# Patient Record
Sex: FEMALE | Race: WHITE | NOT HISPANIC OR LATINO | Employment: FULL TIME | URBAN - METROPOLITAN AREA
[De-identification: names, ages, dates, MRNs, and addresses within clinical notes are randomized per-mention and may not be internally consistent; named-entity substitution may affect disease eponyms.]

---

## 2017-01-04 ENCOUNTER — RADIANT APPOINTMENT (OUTPATIENT)
Dept: MAMMOGRAPHY | Facility: CLINIC | Age: 42
End: 2017-01-04
Payer: COMMERCIAL

## 2017-01-04 DIAGNOSIS — Z00.00 ROUTINE GENERAL MEDICAL EXAMINATION AT A HEALTH CARE FACILITY: ICD-10-CM

## 2017-01-04 PROCEDURE — G0202 SCR MAMMO BI INCL CAD: HCPCS | Mod: TC

## 2017-01-13 ENCOUNTER — ALLIED HEALTH/NURSE VISIT (OUTPATIENT)
Dept: NURSING | Facility: CLINIC | Age: 42
End: 2017-01-13
Payer: COMMERCIAL

## 2017-01-13 VITALS — DIASTOLIC BLOOD PRESSURE: 78 MMHG | SYSTOLIC BLOOD PRESSURE: 114 MMHG

## 2017-01-13 DIAGNOSIS — Z01.30 BP CHECK: Primary | ICD-10-CM

## 2017-01-13 PROCEDURE — 99207 ZZC NO CHARGE NURSE ONLY: CPT

## 2017-01-13 NOTE — PROGRESS NOTES
Toshia Hebert is a 41 year old female who comes in today for a Blood Pressure check because of medication change.    *Document pulse and BP  *Use new set of vitals button for multiple readings.  *Use extended vitals for orthostatic    Vitals as recorded, a regular  cuff was used.    Patient is taking medication as prescribed  Patient is tolerating medications well.  Patient is monitoring Blood Pressure at home, but not lately.  Average readings if yes are high in the past    Current complaints: none    Disposition: results routed to MD/AP    ** question about the medication, is it a problem that she is in the sun a lot with this medication. **

## 2017-01-13 NOTE — Clinical Note
Patient has a question about her medication, heard that it is a issue with taking this medication and being in the sun a lot. Is this a concern?

## 2017-02-14 DIAGNOSIS — R51.9 NONINTRACTABLE HEADACHE, UNSPECIFIED CHRONICITY PATTERN, UNSPECIFIED HEADACHE TYPE: ICD-10-CM

## 2017-02-14 RX ORDER — BUTALBITAL, ACETAMINOPHEN AND CAFFEINE 50; 325; 40 MG/1; MG/1; MG/1
1 TABLET ORAL EVERY 4 HOURS PRN
Qty: 28 TABLET | Refills: 0 | Status: SHIPPED | OUTPATIENT
Start: 2017-02-14 | End: 2017-05-10

## 2017-02-14 NOTE — TELEPHONE ENCOUNTER
Rx was faxed to [facility], pharmacy will notify patient when ready to be picked up. Filled at TC desk.     Tayo Cano

## 2017-02-14 NOTE — TELEPHONE ENCOUNTER
Reason for call: Medication   If this is a refill request, has the caller requested the refill from the pharmacy already? N/A  Will the patient be using a [facility]? No  Name of the pharmacy and phone number for the current request: [facility] pharmacy in [place], [place]    Name of the medication requested: patients states she lost her paper prescription that she got for her migraines and wants to know if a prescription can be called into [place], [place]    Other request: none    Phone Number Pt can be reached at: Cell number on file:    Telephone Information:   Mobile [phone]     Best Time: anytime  Can we leave a detailed message on this number? YES

## 2017-03-16 ENCOUNTER — MYC MEDICAL ADVICE (OUTPATIENT)
Dept: FAMILY MEDICINE | Facility: CLINIC | Age: 42
End: 2017-03-16

## 2017-05-10 DIAGNOSIS — R51.9 NONINTRACTABLE HEADACHE, UNSPECIFIED CHRONICITY PATTERN, UNSPECIFIED HEADACHE TYPE: ICD-10-CM

## 2017-05-10 NOTE — TELEPHONE ENCOUNTER
Pending Prescriptions:                       Disp   Refills    butalbital-acetaminophen-caffeine (FIORIC*28 tab*0            Sig: Take 1 tablet by mouth every 4 hours as needed              Last Written Prescription Date: 2/14/2017  Last Fill Quantity: 28,  # refills: 0   Last Office Visit with [facility], [facility] or [facility] prescribing provider: 12/30/2016

## 2017-05-10 NOTE — TELEPHONE ENCOUNTER
Routing refill request to provider for review/approval because:  Drug not on the [facility] refill protocol.  Daniela Sanchez RN

## 2017-05-11 RX ORDER — BUTALBITAL, ACETAMINOPHEN AND CAFFEINE 50; 325; 40 MG/1; MG/1; MG/1
1 TABLET ORAL EVERY 4 HOURS PRN
Qty: 28 TABLET | Refills: 0 | Status: SHIPPED | OUTPATIENT
Start: 2017-05-11 | End: 2017-11-10

## 2017-05-11 NOTE — TELEPHONE ENCOUNTER
Rx was faxed to [facility], pharmacy will notify patient when ready to be picked up.   Filed at  desk.     Tayo Cano   05/11/17

## 2017-08-08 ENCOUNTER — TRANSFERRED RECORDS (OUTPATIENT)
Dept: HEALTH INFORMATION MANAGEMENT | Facility: CLINIC | Age: 42
End: 2017-08-08

## 2017-11-10 ENCOUNTER — OFFICE VISIT (OUTPATIENT)
Dept: FAMILY MEDICINE | Facility: CLINIC | Age: 42
End: 2017-11-10
Payer: COMMERCIAL

## 2017-11-10 VITALS
DIASTOLIC BLOOD PRESSURE: 80 MMHG | TEMPERATURE: 97.9 F | WEIGHT: 175 LBS | BODY MASS INDEX: 26.41 KG/M2 | SYSTOLIC BLOOD PRESSURE: 118 MMHG | RESPIRATION RATE: 14 BRPM | HEART RATE: 66 BPM

## 2017-11-10 DIAGNOSIS — I10 BENIGN ESSENTIAL HYPERTENSION: ICD-10-CM

## 2017-11-10 DIAGNOSIS — R51.9 NONINTRACTABLE HEADACHE, UNSPECIFIED CHRONICITY PATTERN, UNSPECIFIED HEADACHE TYPE: ICD-10-CM

## 2017-11-10 DIAGNOSIS — L98.9 SKIN LESION: Primary | ICD-10-CM

## 2017-11-10 DIAGNOSIS — J45.20 MILD INTERMITTENT ASTHMA WITHOUT COMPLICATION: ICD-10-CM

## 2017-11-10 PROCEDURE — 99214 OFFICE O/P EST MOD 30 MIN: CPT | Performed by: NURSE PRACTITIONER

## 2017-11-10 RX ORDER — LISINOPRIL 10 MG/1
10 TABLET ORAL DAILY
Qty: 90 TABLET | Refills: 1 | Status: SHIPPED | OUTPATIENT
Start: 2017-11-10 | End: 2018-08-07

## 2017-11-10 RX ORDER — BUTALBITAL, ACETAMINOPHEN AND CAFFEINE 50; 325; 40 MG/1; MG/1; MG/1
1 TABLET ORAL EVERY 4 HOURS PRN
Qty: 28 TABLET | Refills: 0 | Status: SHIPPED | OUTPATIENT
Start: 2017-11-10 | End: 2018-09-07

## 2017-11-10 NOTE — PROGRESS NOTES
"HPI    SUBJECTIVE:   Toshia Hebert is a 42 year old female who presents to clinic today for the following health issues:      Breast Problem      Duration: noticed this morning    Description (location/character/radiation): red lump on right breast.     Intensity:  mild    Accompanying signs and symptoms: none    History (similar episodes/previous evaluation): None    Precipitating or alleviating factors: None    Therapies tried and outcome: None   Has \"maybe\" has this bump before.  No itching and is non-tender.  In the past has \"maybe\" had a deep pain in the R breast, but attributed this to her underwire.     Hypertension Follow-up      Outpatient blood pressures are being checked at home.  Results are Systolic around 130 and diastolic in the 80s.  Stopped taking lisinopril with HCTZ because felt like BP was too low.  When she would take it would feel really dizzy.  Checked BP and \"top number would go way down\".  Systolic in the low 100s.      Low Salt Diet: no added salt  Had been on lisinopril for awhile and had HCTZ added about a year ago.        Problem list and histories reviewed & adjusted, as indicated.  Additional history: as documented    Current Outpatient Prescriptions   Medication Sig Dispense Refill     butalbital-acetaminophen-caffeine (FIORICET/ESGIC) -40 MG per tablet Take 1 tablet by mouth every 4 hours as needed 28 tablet 0     albuterol (PROAIR HFA, PROVENTIL HFA, VENTOLIN HFA) 108 (90 BASE) MCG/ACT inhaler Inhale 2 puffs into the lungs every 6 hours as needed for shortness of breath / dyspnea or wheezing 1 Inhaler 1     Magnesium Citrate POWD        UNKNOWN MED DOSAGE probiotic       lisinopril-hydrochlorothiazide (PRINZIDE/ZESTORETIC) 10-12.5 MG per tablet Take 1 tablet by mouth daily (Patient not taking: Reported on 11/10/2017) 90 tablet 1     Allergies   Allergen Reactions     Oxycodone Anxiety and Itching       Reviewed and updated as needed this visit by clinical staffTobacco  " Allergies  Problems  Med Hx  Soc Hx      Reviewed and updated as needed this visit by Provider         ROS:  Constitutional, HEENT, cardiovascular, pulmonary, gi and gu systems are negative, except as otherwise noted.      OBJECTIVE:   /80 (BP Location: Right arm, Cuff Size: Adult Regular)  Pulse 66  Temp 97.9  F (36.6  C) (Oral)  Resp 14  Wt 175 lb (79.4 kg)  LMP 01/21/2013  BMI 26.41 kg/m2  Body mass index is 26.41 kg/(m^2).  GENERAL: healthy, alert and no distress  EYES: Eyes grossly normal to inspection and conjunctivae and sclerae normal  NECK: no adenopathy, no asymmetry, masses, or scars and thyroid normal to palpation  RESP: lungs clear to auscultation - no rales, rhonchi or wheezes  CV: regular rate and rhythm, normal S1 S2, no S3 or S4, no murmur, click or rub, no peripheral edema and peripheral pulses strong  MS: no gross musculoskeletal defects noted, no edema  SKIN: ~3 mm slightly rasied papule, nontender, no swelling on the R breast about 2 cm above the aerola at the 8:00 position  NEURO: Normal strength and tone, mentation intact and speech normal  PSYCH: mentation appears normal, affect normal/bright    Diagnostic Test Results:  none     ASSESSMENT/PLAN:   1. Nonintractable headache, unspecified chronicity pattern, unspecified headache type  Using sparingly; refilled.   - butalbital-acetaminophen-caffeine (FIORICET/ESGIC) -40 MG per tablet; Take 1 tablet by mouth every 4 hours as needed  Dispense: 28 tablet; Refill: 0    2. Benign essential hypertension  Will stop HCTZ and have her continue with just lisinopril.  Check BP at home.  If >140/90 return for follow up otherwise see back in 6 mos.   - lisinopril (PRINIVIL/ZESTRIL) 10 MG tablet; Take 1 tablet (10 mg) by mouth daily  Dispense: 90 tablet; Refill: 1    3. Mild intermittent asthma without complication  ACT updated.  Controlled.    4. Skin lesion  Likely benign.  Monitor.  If any changing return for follow up.        Eloisa  NELSY Cohen [facility]      Physical Exam

## 2017-11-10 NOTE — PATIENT INSTRUCTIONS
If bump persists over the next 2 weeks return for follow up sooner if any worsening such as increasing in size, pain, tenderness or spreading to other areas.

## 2017-11-10 NOTE — MR AVS SNAPSHOT
After Visit Summary   11/10/2017    Toshia Hebert    MRN: [id number]           Patient Information     Date Of Birth          1975        Visit Information        Provider Department      11/10/2017 2:40 PM Eloisa Mchugh APRN CNP [facility]        Today's Diagnoses     Benign essential hypertension    -  1    Nonintractable headache, unspecified chronicity pattern, unspecified headache type        Mild intermittent asthma without complication          Care Instructions    If bump persists over the next 2 weeks return for follow up sooner if any worsening such as increasing in size, pain, tenderness or spreading to other areas.            Follow-ups after your visit        Follow-up notes from your care team     Return if symptoms worsen or fail to improve.      Who to contact     If you have questions or need follow up information about today's clinic visit or your schedule please contact [facility] directly at [phone].  Normal or non-critical lab and imaging results will be communicated to you by Emulishart, letter or phone within 4 business days after the clinic has received the results. If you do not hear from us within 7 days, please contact the clinic through Emulishart or phone. If you have a critical or abnormal lab result, we will notify you by phone as soon as possible.  Submit refill requests through Swarm or call your pharmacy and they will forward the refill request to us. Please allow 3 business days for your refill to be completed.          Additional Information About Your Visit        MyChart Information     Swarm gives you secure access to your electronic health record. If you see a primary care provider, you can also send messages to your care team and make appointments. If you have questions, please call your primary care clinic.  If you do not have a primary care provider, please call [phone] and they will assist you.         Care EveryWhere ID     This is your Care EveryWhere ID. This could be used by other organizations to access your [facility] medical records  [id number]        Your Vitals Were     Pulse Temperature Respirations Last Period BMI (Body Mass Index)       66 97.9  F (36.6  C) (Oral) 14 01/21/2013 26.41 kg/m2        Blood Pressure from Last 3 Encounters:   11/10/17 118/80   01/13/17 114/78   12/30/16 126/90    Weight from Last 3 Encounters:   11/10/17 175 lb (79.4 kg)   12/30/16 174 lb (78.9 kg)   03/21/16 187 lb 4.8 oz (85 kg)              Today, you had the following     No orders found for display         Today's Medication Changes          These changes are accurate as of: 11/10/17  3:28 PM.  If you have any questions, ask your nurse or doctor.               Start taking these medicines.        Dose/Directions    lisinopril 10 MG tablet   Commonly known as:  PRINIVIL/ZESTRIL   Used for:  Benign essential hypertension   Started by:  Eloisa Mchugh APRN CNP        Dose:  10 mg   Take 1 tablet (10 mg) by mouth daily   Quantity:  90 tablet   Refills:  1            Where to get your medicines      These medications were sent to [id number] - [place], [place] - [address]  [address] [address] [facility] [address]     Phone:  [phone]     lisinopril 10 MG tablet         Some of these will need a paper prescription and others can be bought over the counter.  Ask your nurse if you have questions.     Bring a paper prescription for each of these medications     butalbital-acetaminophen-caffeine -40 MG per tablet                Primary Care Provider Office Phone # Fax #    Dimple Reid [phone]   [facility] [address]        Equal Access to Services     [facility] ASHUTOSH : Lucy ballesteroso Sopauly, waaxda luqadaha, qaybta kaalmada adeegyada, bill chopra. So [facility] [phone].    ATENCIÓN: Si genet carrasco,  tiene a pereyra disposición servicios gratuitos de asistencia lingüística. Isac santos [phone].    We comply with applicable federal civil rights laws and [place] laws. We do not discriminate on the basis of race, color, national origin, age, disability, sex, sexual orientation, or gender identity.            Thank you!     Thank you for choosing [facility]  for your care. Our goal is always to provide you with excellent care. Hearing back from our patients is one way we can continue to improve our services. Please take a few minutes to complete the written survey that you may receive in the mail after your visit with us. Thank you!             Your Updated Medication List - Protect others around you: Learn how to safely use, store and throw away your medicines at www.disposemymeds.org.          This list is accurate as of: 11/10/17  3:28 PM.  Always use your most recent med list.                   Brand Name Dispense Instructions for use Diagnosis    albuterol 108 (90 BASE) MCG/ACT Inhaler    PROAIR HFA/PROVENTIL HFA/VENTOLIN HFA    1 Inhaler    Inhale 2 puffs into the lungs every 6 hours as needed for shortness of breath / dyspnea or wheezing    Mild intermittent asthma       butalbital-acetaminophen-caffeine -40 MG per tablet    FIORICET/ESGIC    28 tablet    Take 1 tablet by mouth every 4 hours as needed    Nonintractable headache, unspecified chronicity pattern, unspecified headache type       lisinopril 10 MG tablet    PRINIVIL/ZESTRIL    90 tablet    Take 1 tablet (10 mg) by mouth daily    Benign essential hypertension       lisinopril-hydrochlorothiazide 10-12.5 MG per tablet    PRINZIDE/ZESTORETIC    90 tablet    Take 1 tablet by mouth daily    Essential hypertension, benign       Magnesium Citrate Powd           UNKNOWN MED DOSAGE      probiotic

## 2017-11-11 ASSESSMENT — ASTHMA QUESTIONNAIRES: ACT_TOTALSCORE: 24

## 2018-01-19 ENCOUNTER — APPOINTMENT (OUTPATIENT)
Dept: GENERAL RADIOLOGY | Facility: CLINIC | Age: 43
End: 2018-01-19
Attending: EMERGENCY MEDICINE
Payer: COMMERCIAL

## 2018-01-19 ENCOUNTER — HOSPITAL ENCOUNTER (EMERGENCY)
Facility: CLINIC | Age: 43
Discharge: HOME | End: 2018-01-20
Attending: EMERGENCY MEDICINE | Admitting: EMERGENCY MEDICINE
Payer: COMMERCIAL

## 2018-01-19 ENCOUNTER — APPOINTMENT (OUTPATIENT)
Dept: CT IMAGING | Facility: CLINIC | Age: 43
End: 2018-01-19
Attending: EMERGENCY MEDICINE
Payer: COMMERCIAL

## 2018-01-19 VITALS
HEART RATE: 93 BPM | SYSTOLIC BLOOD PRESSURE: 154 MMHG | WEIGHT: 165 LBS | DIASTOLIC BLOOD PRESSURE: 96 MMHG | BODY MASS INDEX: 24.9 KG/M2 | TEMPERATURE: 98.5 F | OXYGEN SATURATION: 100 % | RESPIRATION RATE: 18 BRPM

## 2018-01-19 DIAGNOSIS — S09.90XA CLOSED HEAD INJURY, INITIAL ENCOUNTER: ICD-10-CM

## 2018-01-19 DIAGNOSIS — S50.02XA CONTUSION OF LEFT ELBOW, INITIAL ENCOUNTER: ICD-10-CM
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
## 2018-01-19 DIAGNOSIS — S06.0X1A CONCUSSION WITH LOSS OF CONSCIOUSNESS OF 30 MINUTES OR LESS, INITIAL ENCOUNTER: ICD-10-CM

## 2018-01-19 PROCEDURE — 99284 EMERGENCY DEPT VISIT MOD MDM: CPT | Mod: 25

## 2018-01-19 PROCEDURE — 73080 X-RAY EXAM OF ELBOW: CPT | Mod: LT

## 2018-01-19 PROCEDURE — 70450 CT HEAD/BRAIN W/O DYE: CPT

## 2018-01-19 NOTE — ED AVS SNAPSHOT
[facility] Emergency Department    [address]    [facility] [address]    Phone:  [phone]    Fax:  [phone]                                       Toshia Hebert   MRN: [id number]    Department:  [facility] Emergency Department   Date of Visit:  1/19/2018           After Visit Summary Signature Page     I have received my discharge instructions, and my questions have been answered. I have discussed any challenges I see with this plan with the nurse or doctor.    ..........................................................................................................................................  Patient/Patient Representative Signature      ..........................................................................................................................................  Patient Representative Print Name and Relationship to Patient    ..................................................               ................................................  Date                                            Time    ..........................................................................................................................................  Reviewed by Signature/Title    ...................................................              ..............................................  Date                                                            Time

## 2018-01-19 NOTE — ED AVS SNAPSHOT
[facility] Emergency Department    [address]    [facility] [address]    Phone:  [phone]    Fax:  [phone]                                       Toshia Hebert   MRN: [id number]    Department:  [facility] Emergency Department   Date of Visit:  1/19/2018           Patient Information     Date Of Birth          1975        Your diagnoses for this visit were:     Fall due to slipping on ice or snow, initial encounter     Closed head injury, initial encounter     Contusion of left elbow, initial encounter     Concussion with loss of consciousness of 30 minutes or less, initial encounter        You were seen by Shimon Vazquez MD.      Follow-up Information     Follow up with Dimple Reid MD.    Specialty:  Family Practice    Why:  As needed    Contact information:    [id number]   [facility] [address]  [phone]          Discharge Instructions       Discharge Instructions  Concussion    You were seen today for signs of a concussion.  The symptoms will vary, depending on the nature of your injury and your health. You may have: headache, confusion, nausea (feel sick to your stomach), vomiting (throwing up) and problems with memory, concentrating, or sleep. You may feel dizzy, irritable, and tired. Children and teens may need help from their parents, teachers, and coaches to watch for symptoms as they recover.    Generally, every Emergency Department visit should have a follow-up clinic visit with either a primary or a specialty clinic/provider. Please follow-up as instructed by your emergency provider today.     Return to the Emergency Department if:    Your headache gets worse or you start to have a really bad headache even with the recommended treatment plan.     You feel drowsier, have growing confusion, or slurred speech.     You keep repeating yourself.     You have strange behavior or are feeling more irritable.     You have a seizure.      You vomit (throw up) more than once.     You have trouble walking.     You have weakness or numbness.    Your neck pain gets worse.     You have a loss of consciousness.     You have blood for fluid coming from your ears or nose.     You have new symptoms or anything that worries you.     Home Care:    Get lots of rest and get enough sleep at night. Take daytime naps or rest if you feel tired.     Limit physical activity and  thinking  activities. These can make symptoms worse.   o Physical activities include gym, sports, weight training, running, exercise, and heavy lifting.   o Thinking activities include homework, class work, job-related work, and screen time (phone, computer, tablet, TV, and video games).     Stick to a healthy diet and drink lots of fluids. Avoid alcohol.    As symptoms improve, you may slowly return to your daily activities. If symptoms get worse or return, reduce your activity.     Know that it is normal to feel sad or frustrated when you do not feel right and are less active.     Going Back to Work:    Your care team will tell you when you are ready to return to work.      Limit the amount of work you do soon after your injury. This may speed healing. Take breaks if your symptoms get worse. You should also reduce your physical activity as well as activities that require a lot of thinking until you see your doctor. You may need shorter work days and a lighter workload.  Avoid heavy lifting, working with machinery, driving and working at heights until your symptoms are gone or you are cleared by a provider.    Going Back to School:    If you are still having symptoms, you may need extra help at school.    Tell your teachers and school nurse about your injury and symptoms. Ask them to watch for problems with learning, memory, and concentrating. Symptoms may get worse when you do schoolwork, and you may become more irritable. You may need shorter school days, a reduced workload, and to  postpone testing.  Do not drive or take gym class (physical activity) until cleared by a provider.    Returning to Sports:    Never return to play if you have any symptoms. A full recovery will reduce the chances of getting hurt again. Remember, it is better to miss one or two games than a whole season.    You should rest from all physical activity until you see your provider. Generally, if all symptoms have completely cleared, your provider can help guide you to slowly return to sports. If symptoms return or worsen, stop the activity and see your provider.    Important: If you are in an organized sport and under age 18, you will need written consent from a healthcare provider before you return to sports. Typically, this will be your primary care or sports medicine provider. Please make an appointment.    If you were given a prescription for medicine here today, be sure to read all of the information (including the package insert) that comes with your prescription.  This will include important information about the medicine, its side effects, and any warnings that you need to know about.  The pharmacist who fills the prescription can provide more information and answer questions you may have about the medicine.  If you have questions or concerns that the pharmacist cannot address, please call or return to the Emergency Department.     Remember that you can always come back to the Emergency Department if you are not able to see your regular provider in the amount of time listed above, if you get any new symptoms, or if there is anything that worries you.      Discharge References/Attachments     CONTUSION, ELBOW (ENGLISH)      24 Hour Appointment Hotline       To make an appointment at any [facility], call [phone]). If you don't have a family doctor or clinic, we will help you find one. [facility] clinics are conveniently located to serve the needs of you and your family.             Review  of your medicines      START taking        Dose / Directions Last dose taken    methocarbamol 500 MG tablet   Commonly known as:  ROBAXIN   Dose:  500 mg   Quantity:  9 tablet        Take 1 tablet (500 mg) by mouth 3 times daily as needed for muscle spasms   Refills:  0          Our records show that you are taking the medicines listed below. If these are incorrect, please call your family doctor or clinic.        Dose / Directions Last dose taken    albuterol 108 (90 BASE) MCG/ACT Inhaler   Commonly known as:  PROAIR HFA/PROVENTIL HFA/VENTOLIN HFA   Dose:  2 puff   Quantity:  1 Inhaler        Inhale 2 puffs into the lungs every 6 hours as needed for shortness of breath / dyspnea or wheezing   Refills:  1        butalbital-acetaminophen-caffeine -40 MG per tablet   Commonly known as:  FIORICET/ESGIC   Dose:  1 tablet   Quantity:  28 tablet        Take 1 tablet by mouth every 4 hours as needed   Refills:  0        lisinopril 10 MG tablet   Commonly known as:  PRINIVIL/ZESTRIL   Dose:  10 mg   Quantity:  90 tablet        Take 1 tablet (10 mg) by mouth daily   Refills:  1        Magnesium Citrate Powd        Refills:  0                Prescriptions were sent or printed at these locations (1 Prescription)                   Other Prescriptions                Printed at Department/Unit printer (1 of 1)         methocarbamol (ROBAXIN) 500 MG tablet                Procedures and tests performed during your visit     Elbow  XR, G/E 3 views, left    Head CT w/o contrast      Orders Needing Specimen Collection     None      Pending Results     No orders found for last 3 day(s).            Pending Culture Results     No orders found for last 3 day(s).            Pending Results Instructions     If you had any lab results that were not finalized at the time of your Discharge, you can call the ED Lab Result RN at [phone]. You will be contacted by this team for any positive Lab results or changes in treatment. The  nurses are available 7 days a week from 10A to 6:30P.  You can leave a message 24 hours per day and they will return your call.        Test Results From Your Hospital Stay        1/20/2018 12:02 AM      Narrative     CT SCAN OF THE HEAD WITHOUT CONTRAST   1/19/2018 11:56 PM     HISTORY: Fall.    TECHNIQUE:  Axial images of the head and coronal reformations without  IV contrast material. Radiation dose for this scan was reduced using  automated exposure control, adjustment of the mA and/or kV according  to patient size, or iterative reconstruction technique.    COMPARISON: 7/30/2014.    FINDINGS:  The ventricles are normal in size, shape and configuration.   The brain parenchyma and subarachnoid spaces are normal. There is no  evidence of intracranial hemorrhage, mass, acute infarct or anomaly.     The visualized portions of the sinuses and mastoids appear normal.  There is no evidence of trauma.        Impression     IMPRESSION: No acute abnormality.      NIC OCHOA MD         1/20/2018  1:19 AM      Narrative     XR ELBOW LT G/E 3 VW  1/20/2018 1:11 AM      HISTORY: Fall on ice.    COMPARISON: None.        Impression     IMPRESSION: No acute fracture or dislocation.    NIC OCHOA MD                Clinical Quality Measure: Blood Pressure Screening     Your blood pressure was checked while you were in the emergency department today. The last reading we obtained was  BP: (!) 154/96 . Please read the guidelines below about what these numbers mean and what you should do about them.  If your systolic blood pressure (the top number) is less than 120 and your diastolic blood pressure (the bottom number) is less than 80, then your blood pressure is normal. There is nothing more that you need to do about it.  If your systolic blood pressure (the top number) is 120-139 or your diastolic blood pressure (the bottom number) is 80-89, your blood pressure may be higher than it should be. You should have your blood  pressure rechecked within a year by a primary care provider.  If your systolic blood pressure (the top number) is 140 or greater or your diastolic blood pressure (the bottom number) is 90 or greater, you may have high blood pressure. High blood pressure is treatable, but if left untreated over time it can put you at risk for heart attack, stroke, or kidney failure. You should have your blood pressure rechecked by a primary care provider within the next 4 weeks.  If your provider in the emergency department today gave you specific instructions to follow-up with your doctor or provider even sooner than that, you should follow that instruction and not wait for up to 4 weeks for your follow-up visit.        Thank you for choosing [facility]       Thank you for choosing [facility] for your care. Our goal is always to provide you with excellent care. Hearing back from our patients is one way we can continue to improve our services. Please take a few minutes to complete the written survey that you may receive in the mail after you visit with us. Thank you!        [employer] Information     [facility] gives you secure access to your electronic health record. If you see a primary care provider, you can also send messages to your care team and make appointments. If you have questions, please call your primary care clinic.  If you do not have a primary care provider, please call [phone] and they will assist you.        Care EveryWhere ID     This is your Care EveryWhere ID. This could be used by other organizations to access your [facility] medical records  [id number]        Equal Access to Services     DIVYA BOYKIN : Hadii gonzalez Andrew, wafiona mast, qaybta booalbill gonzalez . So [facility] [phone].    ATENCIÓN: Si habla español, tiene a pereyra disposición servicios gratuitos de asistencia lingüística. Llame al [phone].    We comply with applicable federal civil rights laws  and [place] laws. We do not discriminate on the basis of race, color, national origin, age, disability, sex, sexual orientation, or gender identity.            After Visit Summary       This is your record. Keep this with you and show to your community pharmacist(s) and doctor(s) at your next visit.

## 2018-01-19 NOTE — LETTER
January 20, 2018      To Whom It May Concern:      Toshia Hebert was seen in our Emergency Department today, 01/20/18.  I expect her condition to improve over the next 2 days.  She may return to work/school when improved.    Sincerely,        Argenis Ferguson RN

## 2018-01-20 PROCEDURE — 25000132 ZZH RX MED GY IP 250 OP 250 PS 637: Performed by: EMERGENCY MEDICINE

## 2018-01-20 RX ORDER — METHOCARBAMOL 500 MG/1
500 TABLET, FILM COATED ORAL 3 TIMES DAILY PRN
Qty: 9 TABLET | Refills: 0 | Status: SHIPPED | OUTPATIENT
Start: 2018-01-20 | End: 2019-04-12

## 2018-01-20 RX ORDER — METHOCARBAMOL 500 MG/1
500 TABLET, FILM COATED ORAL ONCE
Status: COMPLETED | OUTPATIENT
Start: 2018-01-20 | End: 2018-01-20

## 2018-01-20 RX ADMIN — METHOCARBAMOL 500 MG: 500 TABLET ORAL at 01:38

## 2018-01-20 ASSESSMENT — ENCOUNTER SYMPTOMS
NECK PAIN: 1
HEADACHES: 1
NERVOUS/ANXIOUS: 1
NAUSEA: 1

## 2018-01-20 NOTE — ED PROVIDER NOTES
History     Chief Complaint:  Head Injury    HPI   Toshia Hebert is a 42 year old female who presents with a head injury.The patient states she was walking outside around 2100 today when she slipped on ice, causing her to hit her left elbow, fall back on her back first, and then hit her head on the ground hard. She also reports to have had a temporary loss of consciousness for an unspecified time with associated confusion. The patient reports her pain as 7/10 pain scale and localized bilaterally to the base of her neck with a diffuse headache. The patient reports to feel jittery and nauseous.     Allergies:  Oxycodone     Medications:    Fioricet/Esgic   Lisinopril  Albuterol     Past Medical History:    Dysmenorrhea  Incontinence of Urine  Asthma   Hypertension  Generalized Anxiety Disorder  Hypertension  TMJ disorder     Past Surgical History:    WESTON AMES, Cervical  Transpubic sling surgery  TMJ Surgery  Uterine Ablation    Family History:    Hypertension, father    Social History:  Marital Status:     Tobacco Status: Former Smoker, 2005 quit date  Alcohol use: Occasional     Review of Systems   Gastrointestinal: Positive for nausea.   Musculoskeletal: Positive for neck pain.   Neurological: Positive for syncope and headaches.   Psychiatric/Behavioral: The patient is nervous/anxious (feels jittery).    All other systems reviewed and are negative.      Physical Exam     Patient Vitals for the past 24 hrs:   BP Temp Temp src Pulse Resp SpO2 Weight   01/19/18 2227 (!) 154/96 98.5  F (36.9  C) Temporal 93 18 100 % 74.8 kg (165 lb)     Physical Exam  Nursing note and vitals reviewed.  Constitutional: Cooperative. Wearing sunglasses.   HENT:   Mouth/Throat: Mucous membranes are normal.   No palpable scalp hematoma.   Neck: No spinous process tenderness. Full range of motion of the neck  Cardiovascular: Normal rate, regular rhythm and normal heart sounds.  No murmur.  Pulmonary/Chest: Effort normal and  breath sounds normal. No respiratory distress. No wheezes. No rales.   Abdominal: Soft. Normal appearance and bowel sounds are normal. No distension. There is no tenderness. There is no rigidity and no guarding.   Musculoskeletal: Normal range of motion. Swelling and tenderness surrounding olecranon.  Neurological: Alert. Oriented x4.  GCS 15.  Strength and sensation in extremities normal.  CN 2-12 intact.   Skin: Skin is warm and dry.   Psychiatric: Normal mood and affect.       Emergency Department Course     Imaging:  Radiographic findings were communicated with the patient who voiced understanding of the findings.    X-ray Left Elbow, 3 views:  No acute fracture or dislocation  Result per radiology.     CT-scan Head w/o contrast:  No acute abnormality.  Result per radiology.     Interventions:  0138: Robaxin 500 mg PO    Emergency Department Course:  Past medical records, nursing notes, and vitals reviewed.  2323: I performed an exam of the patient and obtained history, as documented above.  The patient was sent for a X-ray and CT-scan while in the emergency department, findings above.   0125: I rechecked the patient. Findings and plan explained to the Patient. Patient discharged home with instructions regarding supportive care, medications, and reasons to return. The importance of close follow-up was reviewed.     Impression & Plan      Medical Decision Making:  Toshia Hebert is a 42 year old female who presents after slipping and falling on the ice. She sustained two injuries, one to the left elbow and the other after striking her head on the ice. She may have lost consciousness temporarily and describes headache, light sensitivity, and intermittent dizziness with movement consistent with concussion. Head scan is negative for fracture or intracranial hemorrhage. I have cleared her C-spine clinically. She does have significant spasm in her perispinous musculature, which I will try too treat with ibuprofen,  as well as a short course of muscle relaxants. Elbow X-ray is negative for fracture or any bony abnormaility. I did discuss with her that it is possible to occasionally miss a small fracture to the radial head or supracondylar region, and if she has persistent pain to get another X-ray in a week. She has been given a referral to a concussion clinic if her conditions do not improve, and she has been discharged home.     Diagnosis:    ICD-10-CM    1. Fall due to slipping on ice or snow, initial encounter W00.9XXA    2. Closed head injury, initial encounter S09.90XA    3. Contusion of left elbow, initial encounter S50.02XA    4. Concussion with loss of consciousness of 30 minutes or less, initial encounter S06.0X1A      Discharge Medications:   Details   methocarbamol (ROBAXIN) 500 MG tablet Take 1 tablet (500 mg) by mouth 3 times daily as needed for muscle spasms, Disp-9 tablet, R-0, Local Print     Yung Shepard  1/19/2018   [facility] EMERGENCY DEPARTMENT  I, Yung Shepard, am serving as a scribe at 11:23 PM on 1/19/2018 to document services personally performed by Shimon Vazquez MD based on my observations and the provider's statements to me.         Shimon Vazquez MD  01/20/18 1783

## 2018-01-20 NOTE — ED NOTES
Patient notes she is having some room spinning and also muscle spasms. Requesting something to help with symptoms. Dr. Vazquez updated. Patient notes she did take Ibuprofen prior to arrival and prefers not to take Zofran as it bothers her stomach. Dr. Vazquez updated.

## 2018-01-20 NOTE — ED NOTES
42-year-old female presents to the ER with complaints of slipping and falling on ice about 2050 tonight. Pt states her left arm is painful. States she hit her head and feels nauseated.

## 2018-08-07 DIAGNOSIS — I10 BENIGN ESSENTIAL HYPERTENSION: ICD-10-CM

## 2018-08-07 NOTE — TELEPHONE ENCOUNTER
"Requested Prescriptions   Pending Prescriptions Disp Refills     lisinopril (PRINIVIL/ZESTRIL) 10 MG tablet [Pharmacy Med Name: LISINOPRIL 10 MG TABLET] 90 tablet 1    Last Written Prescription Date:  11/10/17  Last Fill Quantity: 90,  # refills: 1   Last Office Visit: 11/10/2017   Future Office Visit:      Sig: TAKE 1 TABLET BY MOUTH ONCE DAILY    ACE Inhibitors (Including Combos) Protocol Failed    8/7/2018  1:53 AM       Failed - Blood pressure under 140/90 in past 12 months    BP Readings from Last 3 Encounters:   01/19/18 (!) 154/96   11/10/17 118/80   01/13/17 114/78                Failed - Normal serum creatinine on file in past 12 months    Recent Labs   Lab Test  12/30/16   1134   CR  0.68            Failed - Normal serum potassium on file in past 12 months    Recent Labs   Lab Test  12/30/16   1134   POTASSIUM  4.3            Passed - Recent (12 mo) or future (30 days) visit within the authorizing provider's specialty    Patient had office visit in the last 12 months or has a visit in the next 30 days with authorizing provider or within the authorizing provider's specialty.  See \"Patient Info\" tab in inbasket, or \"Choose Columns\" in Meds & Orders section of the refill encounter.           Passed - Patient is age 18 or older       Passed - No active pregnancy on record       Passed - No positive pregnancy test in past 12 months          "

## 2018-08-09 RX ORDER — LISINOPRIL 10 MG/1
TABLET ORAL
Qty: 90 TABLET | Refills: 0 | Status: SHIPPED | OUTPATIENT
Start: 2018-08-09 | End: 2018-09-07

## 2018-08-09 NOTE — TELEPHONE ENCOUNTER
Patient due for an appointment with blood work and blood pressure check.  Appointment scheduled for September 7th.  Will give refill to get patient through.  Daniela Sanchez RN

## 2018-09-07 ENCOUNTER — OFFICE VISIT (OUTPATIENT)
Dept: FAMILY MEDICINE | Facility: CLINIC | Age: 43
End: 2018-09-07
Payer: COMMERCIAL

## 2018-09-07 ENCOUNTER — RADIANT APPOINTMENT (OUTPATIENT)
Dept: GENERAL RADIOLOGY | Facility: CLINIC | Age: 43
End: 2018-09-07
Attending: FAMILY MEDICINE
Payer: COMMERCIAL

## 2018-09-07 VITALS
RESPIRATION RATE: 16 BRPM | BODY MASS INDEX: 25.73 KG/M2 | DIASTOLIC BLOOD PRESSURE: 92 MMHG | HEART RATE: 60 BPM | SYSTOLIC BLOOD PRESSURE: 126 MMHG | TEMPERATURE: 98.1 F | HEIGHT: 68 IN | WEIGHT: 169.8 LBS

## 2018-09-07 DIAGNOSIS — D16.22 BENIGN NEOPLASM OF LONG BONE OF LEFT LOWER EXTREMITY: ICD-10-CM

## 2018-09-07 DIAGNOSIS — I10 HYPERTENSION, GOAL BELOW 140/90: Primary | ICD-10-CM

## 2018-09-07 DIAGNOSIS — R51.9 NONINTRACTABLE HEADACHE, UNSPECIFIED CHRONICITY PATTERN, UNSPECIFIED HEADACHE TYPE: ICD-10-CM

## 2018-09-07 DIAGNOSIS — J45.990 EXERCISE-INDUCED ASTHMA: ICD-10-CM

## 2018-09-07 PROCEDURE — 99214 OFFICE O/P EST MOD 30 MIN: CPT | Performed by: FAMILY MEDICINE

## 2018-09-07 PROCEDURE — 73552 X-RAY EXAM OF FEMUR 2/>: CPT | Mod: LT

## 2018-09-07 RX ORDER — BUTALBITAL, ACETAMINOPHEN AND CAFFEINE 50; 325; 40 MG/1; MG/1; MG/1
1 TABLET ORAL EVERY 4 HOURS PRN
Qty: 30 TABLET | Refills: 0 | Status: SHIPPED | OUTPATIENT
Start: 2018-09-07 | End: 2020-03-17

## 2018-09-07 RX ORDER — LISINOPRIL 10 MG/1
10 TABLET ORAL DAILY
Qty: 90 TABLET | Refills: 0 | Status: SHIPPED | OUTPATIENT
Start: 2018-09-07 | End: 2018-12-29

## 2018-09-07 RX ORDER — ALBUTEROL SULFATE 90 UG/1
2 AEROSOL, METERED RESPIRATORY (INHALATION) EVERY 6 HOURS PRN
Qty: 1 INHALER | Refills: 1 | Status: SHIPPED | OUTPATIENT
Start: 2018-09-07 | End: 2018-09-07

## 2018-09-07 RX ORDER — ALBUTEROL SULFATE 90 UG/1
2 AEROSOL, METERED RESPIRATORY (INHALATION) EVERY 6 HOURS PRN
Qty: 1 INHALER | Refills: 1 | Status: SHIPPED | OUTPATIENT
Start: 2018-09-07

## 2018-09-07 ASSESSMENT — PAIN SCALES - GENERAL: PAINLEVEL: NO PAIN (0)

## 2018-09-07 NOTE — MR AVS SNAPSHOT
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      Future tests that were ordered for you today     Open Future Orders        Priority Expected Expires Ordered    XR Femur Left 2 Views Routine 9/7/2018 9/7/2019 9/7/2018            Who to contact     If you have questions or need follow up information about today's clinic visit or your schedule please contact [facility] directly at [phone].  Normal or non-critical lab and imaging results will be communicated to you by [employer], letter or phone within 4 business days after the clinic has received the results. If you do not hear from us within 7 days, please contact the clinic through [employer] or phone. If you have a critical or abnormal lab result, we will notify you by phone as soon as possible.  Submit refill requests through [employer] or call your pharmacy and they will forward the refill request to us. Please allow 3 business days for your refill to be completed.          Additional Information About Your Visit        MyChart Information     [employer] gives you secure access to your electronic health record. If you see a primary care provider, you can also send messages to your care team and make appointments. If you have questions, please call your primary care clinic.  If you do not have a primary care provider, please call  "[phone] and they will assist you.        Care EveryWhere ID     This is your Care EveryWhere ID. This could be used by other organizations to access your [facility] medical records  [id number]        Your Vitals Were     Pulse Temperature Respirations Height Last Period BMI (Body Mass Index)    60 98.1  F (36.7  C) (Oral) 16 5' 8\" (1.727 m) 01/25/2013 25.82 kg/m2       Blood Pressure from Last 3 Encounters:   09/07/18 (!) 130/96   01/19/18 (!) 154/96   11/10/17 118/80    Weight from Last 3 Encounters:   09/07/18 169 lb 12.8 oz (77 kg)   01/19/18 165 lb (74.8 kg)   11/10/17 175 lb (79.4 kg)                 Today's Medication Changes          These changes are accurate as of 9/7/18 11:59 AM.  If you have any questions, ask your nurse or doctor.               These medicines have changed or have updated prescriptions.        Dose/Directions    * albuterol 108 (90 Base) MCG/ACT inhaler   Commonly known as:  PROAIR HFA/PROVENTIL HFA/VENTOLIN HFA   This may have changed:  Another medication with the same name was added. Make sure you understand how and when to take each.   Used for:  Mild intermittent asthma   Changed by:  Dimple Reid MD        Dose:  2 puff   Inhale 2 puffs into the lungs every 6 hours as needed for shortness of breath / dyspnea or wheezing   Quantity:  1 Inhaler   Refills:  1       * albuterol 108 (90 Base) MCG/ACT inhaler   Commonly known as:  PROAIR HFA   This may have changed:  You were already taking a medication with the same name, and this prescription was added. Make sure you understand how and when to take each.   Used for:  Exercise-induced asthma   Changed by:  Dimple Reid MD        Dose:  2 puff   Inhale 2 puffs into the lungs every 6 hours as needed for shortness of breath / dyspnea   Quantity:  1 Inhaler   Refills:  1       lisinopril 10 MG tablet   Commonly known as:  PRINIVIL/ZESTRIL   This may have changed:  See the new instructions.   Used for:  " Hypertension, goal below 140/90   Changed by:  Dimple Reid MD        Dose:  10 mg   Take 1 tablet (10 mg) by mouth daily   Quantity:  90 tablet   Refills:  0       * Notice:  This list has 2 medication(s) that are the same as other medications prescribed for you. Read the directions carefully, and ask your doctor or other care provider to review them with you.         Where to get your medicines      These medications were sent to [facility]/pharmacy #[id number] - [place], [place] - [address].  [address] [facility] [address]     Phone:  [phone]     lisinopril 10 MG tablet         Some of these will need a paper prescription and others can be bought over the counter.  Ask your nurse if you have questions.     Bring a paper prescription for each of these medications     albuterol 108 (90 Base) MCG/ACT inhaler    butalbital-acetaminophen-caffeine -40 MG per tablet                Primary Care Provider Office Phone # Fax #    Dimple Reid [phone]       [id number]  [address]   [facility] [address]        Equal Access to Services     [facility] AH: Hadii aad ku hadasho Soomaali, waaxda luqadaha, qaybta kaalmada adeashuyavilma, bill harmon . So [facility] [phone].    ATENCIÓN: Si habla español, tiene a pereyra disposición servicios gratUNM [facility] de asistencia lingüística. Isac al [phone].    We comply with applicable federal civil rights laws and [place] laws. We do not discriminate on the basis of race, color, national origin, age, disability, sex, sexual orientation, or gender identity.            Thank you!     Thank you for choosing [facility]  for your care. Our goal is always to provide you with excellent care. Hearing back from our patients is one way we can continue to improve our services. Please take a few minutes to complete the written survey that you may receive in the mail after your visit with us. Thank you!              Your Updated Medication List - Protect others around you: Learn how to safely use, store and throw away your medicines at www.disposemymeds.org.          This list is accurate as of 9/7/18 11:59 AM.  Always use your most recent med list.                   Brand Name Dispense Instructions for use Diagnosis    * albuterol 108 (90 Base) MCG/ACT inhaler    PROAIR HFA/PROVENTIL HFA/VENTOLIN HFA    1 Inhaler    Inhale 2 puffs into the lungs every 6 hours as needed for shortness of breath / dyspnea or wheezing    Mild intermittent asthma       * albuterol 108 (90 Base) MCG/ACT inhaler    PROAIR HFA    1 Inhaler    Inhale 2 puffs into the lungs every 6 hours as needed for shortness of breath / dyspnea    Exercise-induced asthma       butalbital-acetaminophen-caffeine -40 MG per tablet    FIORICET/ESGIC    30 tablet    Take 1 tablet by mouth every 4 hours as needed    Nonintractable headache, unspecified chronicity pattern, unspecified headache type       lisinopril 10 MG tablet    PRINIVIL/ZESTRIL    90 tablet    Take 1 tablet (10 mg) by mouth daily    Hypertension, goal below 140/90       Magnesium Citrate Powd           methocarbamol 500 MG tablet    ROBAXIN    9 tablet    Take 1 tablet (500 mg) by mouth 3 times daily as needed for muscle spasms        * Notice:  This list has 2 medication(s) that are the same as other medications prescribed for you. Read the directions carefully, and ask your doctor or other care provider to review them with you.

## 2018-09-07 NOTE — PROGRESS NOTES
SUBJECTIVE:   Toshia Hebert is a 43 year old female who presents to clinic today for the following health issues:    Hypertension Follow-up      Outpatient blood pressures are not being checked.    Low Salt Diet: no added salt      Amount of exercise or physical activity: None, does a little bit of biking     Problems taking medications regularly: No    Medication side effects: none    Diet: regular (no restrictions)    Taking lisinopril, when she took the water pill to low, so wants to just take the lisinopril. It is working well, no side effects. If misses a dose will get a headache, but rarely misses      Left leg pain 1 month ago, lasted 1 week, deep in the upper thigh area. Hx of benign mass in the bone, seen by [place], they felt it was benign, but keep eye on it, with x-rays every 6 months. She is due for this today.      Declined a flu shot    Migraines are well controlled, takes 2 pills to get rid of the headache, comes at most 1 per month, might be related to hormones. Needing refill on fioricet, rarely takes it, last rx lasted nearly a yr        PROBLEMS TO ADD ON...    Problem list and histories reviewed & adjusted, as indicated.  Additional history: as documented    BP Readings from Last 3 Encounters:   09/07/18 (!) 126/92   01/19/18 (!) 154/96   11/10/17 118/80    Wt Readings from Last 3 Encounters:   09/07/18 169 lb 12.8 oz (77 kg)   01/19/18 165 lb (74.8 kg)   11/10/17 175 lb (79.4 kg)                  Labs reviewed in EPIC    Reviewed and updated as needed this visit by clinical staff  Tobacco  Allergies  Meds  Problems  Med Hx  Surg Hx  Fam Hx  Soc Hx        Reviewed and updated as needed this visit by Provider         ROS:  Constitutional, HEENT, cardiovascular, pulmonary, gi and gu systems are negative, except as otherwise noted.    OBJECTIVE:     BP (!) 126/92 (BP Location: Right arm, Patient Position: Chair, Cuff Size: Adult Regular)  Pulse 60  Temp 98.1  F (36.7  C) (Oral)  Resp 16  " Ht 5' 8\" (1.727 m)  Wt 169 lb 12.8 oz (77 kg)  LMP 01/25/2013  BMI 25.82 kg/m2  Body mass index is 25.82 kg/(m^2).  GENERAL: healthy, alert and no distress  EYES: Eyes grossly normal to inspection,  and conjunctivae and sclerae normal  HENT: ear canals and TM's normal, nose and mouth without ulcers or lesions  NECK: no adenopathy, no asymmetry, masses, or scars and thyroid normal to palpation  RESP: lungs clear to auscultation - no rales, rhonchi or wheezes  CV: regular rate and rhythm, normal S1 S2, no S3 or S4, no murmur, click or rub, no peripheral edema and peripheral pulses strong  MS: no gross musculoskeletal defects noted, no edema    Diagnostic Test Results:  none     ASSESSMENT/PLAN:             1. Hypertension, goal below 140/90  Control is fair, recheck end of visit and again in 2 weeks  - lisinopril (PRINIVIL/ZESTRIL) 10 MG tablet; Take 1 tablet (10 mg) by mouth daily  Dispense: 90 tablet; Refill: 0    2. Nonintractable headache, unspecified chronicity pattern, unspecified headache type  Pt not over using fioricet, will refill  - butalbital-acetaminophen-caffeine (FIORICET/ESGIC) -40 MG per tablet; Take 1 tablet by mouth every 4 hours as needed  Dispense: 30 tablet; Refill: 0    3. 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 Exercise-induced asthma  Well controlled, just needs prior to exercise or when ill  - albuterol (PROAIR HFA) 108 (90 Base) MCG/ACT inhaler; Inhale 2 puffs into the lungs every 6 hours as needed for shortness of breath / dyspnea  Dispense: 1 Inhaler; Refill: 1    Declined flu shot    Dimple Reid MD  [facility]    "

## 2018-09-07 NOTE — NURSING NOTE
"Chief Complaint   Patient presents with     Hypertension     Initial BP (!) 130/96 (BP Location: Right arm, Patient Position: Chair, Cuff Size: Adult Regular)  Pulse 60  Temp 98.1  F (36.7  C) (Oral)  Resp 16  Ht 5' 8\" (1.727 m)  Wt 169 lb 12.8 oz (77 kg)  LMP 01/25/2013  BMI 25.82 kg/m2 Estimated body mass index is 25.82 kg/(m^2) as calculated from the following:    Height as of this encounter: 5' 8\" (1.727 m).    Weight as of this encounter: 169 lb 12.8 oz (77 kg).  BP completed using cuff size regular right arm    Lisa Magill, CMA    "

## 2018-09-08 ASSESSMENT — ASTHMA QUESTIONNAIRES: ACT_TOTALSCORE: 24

## 2018-11-12 ENCOUNTER — TELEPHONE (OUTPATIENT)
Dept: FAMILY MEDICINE | Facility: CLINIC | Age: 43
End: 2018-11-12

## 2018-11-12 NOTE — TELEPHONE ENCOUNTER
Panel Management Review      Patient has the following on her problem list:     Depression / Dysthymia review    Measure:  Needs PHQ-9 score of 4 or less during index window.  Administer PHQ-9 and if score is 5 or more, send encounter to provider for next steps.    5 - 7 month window range: LEEANNA-7 due NOW    PHQ-9 SCORE 8/14/2015   Total Score 7       If PHQ-9 recheck is 5 or more, route to provider for next steps.    Patient is due for:  LEEANNA-7     Asthma review     ACT Total Scores 9/7/2018   ACT TOTAL SCORE (Goal Greater than or Equal to 20) 24   In the past 12 months, how many times did you visit the emergency room for your asthma without being admitted to the hospital? 0   In the past 12 months, how many times were you hospitalized overnight because of your asthma? 0      1. Is Asthma diagnosis on the Problem List? Yes    2. Is Asthma listed on Health Maintenance? Yes    3. Patient is due for:  none    Hypertension   Last three blood pressure readings:  BP Readings from Last 3 Encounters:   09/07/18 (!) 126/92   01/19/18 (!) 154/96   11/10/17 118/80     Blood pressure: FAILED    HTN Guidelines:  Age 18-59 BP range:  Less than 140/90  Age 60-85 with Diabetes:  Less than 140/90  Age 60-85 without Diabetes:  less than 150/90      Composite cancer screening  Chart review shows that this patient is due/due soon for the following None  Summary:    Patient is due/failing the following:   BP CHECK    Action needed:   Patient needs nurse only BLOOD PRESSURE check appointment.    Type of outreach:    Sent [employer] message.    Questions for provider review:    None                                                                                                                                    Lisa Magill, CMA       Chart routed to Care Team .

## 2018-11-21 NOTE — TELEPHONE ENCOUNTER
Pt returned call, given message below. She is out of town and will call again to schedule when she has returned. Last OV f/u instructions were to come in for a Physical in March 2019      Tayo Cano   11/20/18 6:11 PM

## 2019-02-04 DIAGNOSIS — I10 HYPERTENSION, GOAL BELOW 140/90: ICD-10-CM

## 2019-02-04 NOTE — TELEPHONE ENCOUNTER
"Requested Prescriptions   Pending Prescriptions Disp Refills     lisinopril (PRINIVIL/ZESTRIL) 10 MG tablet [Pharmacy Med Name: LISINOPRIL 10 MG TABLET]  Last Written Prescription Date:  1/7/19  Last Fill Quantity: 30 TABLET,  # refills: 0   Last office visit: 9/7/2018 with prescribing provider:  HENNY   Future Office Visit:     30 tablet 0     Sig: TAKE 1 TABLET BY MOUTH EVERY DAY    ACE Inhibitors (Including Combos) Protocol Failed - 2/4/2019  1:29 AM       Failed - Blood pressure under 140/90 in past 12 months    BP Readings from Last 3 Encounters:   09/07/18 (!) 126/92   01/19/18 (!) 154/96   11/10/17 118/80                Failed - Normal serum creatinine on file in past 12 months    Recent Labs   Lab Test 12/30/16  1134  07/30/14  1048   CR 0.68   < >  --    CREAT  --   --  0.8    < > = values in this interval not displayed.            Failed - Normal serum potassium on file in past 12 months    Recent Labs   Lab Test 12/30/16  1134   POTASSIUM 4.3            Passed - Recent (12 mo) or future (30 days) visit within the authorizing provider's specialty    Patient had office visit in the last 12 months or has a visit in the next 30 days with authorizing provider or within the authorizing provider's specialty.  See \"Patient Info\" tab in inbasket, or \"Choose Columns\" in Meds & Orders section of the refill encounter.             Passed - Medication is active on med list       Passed - Patient is age 18 or older       Passed - No active pregnancy on record       Passed - No positive pregnancy test within past 12 months          "

## 2019-02-04 NOTE — LETTER
[address], [address]  [facility] [address]  Phone: [phone]  Fax: [phone]    02/05/19    Toshia Hebert  [address] [facility] [address]      Dear Toshia:     We recently received a call from your pharmacy requesting a refill of your medication - lisinopril (PRINIVIL/ZESTRIL) 10 MG tablet.    A review of your chart indicates that an appointment is required with your provider for office visit and BP recheck. Please call the clinic at [phone] to schedule your appointment.    We have authorized one refill of your medication to allow time for you to schedule your appointment.    Taking care of your health is important to us, and ongoing visits with your provider are vital to your care.  We look forward to seeing you in the near future.          Sincerely,      Dimple Reid MD/Radha REYNOSO RN

## 2019-02-05 RX ORDER — LISINOPRIL 10 MG/1
TABLET ORAL
Qty: 30 TABLET | Refills: 0 | Status: SHIPPED | OUTPATIENT
Start: 2019-02-05 | End: 2019-03-01

## 2019-02-05 NOTE — TELEPHONE ENCOUNTER
Return in about 6 months (around 3/7/2019) for wellness exam.   Medication is being filled for 1 time refill only due to:  Patient needs to be seen because due for office visit, BP recheck, letter sent to make appt.     Radha Hummel RN, BS  Clinical Nurse Triage.

## 2019-02-22 ENCOUNTER — TELEPHONE (OUTPATIENT)
Dept: FAMILY MEDICINE | Facility: CLINIC | Age: 44
End: 2019-02-22

## 2019-02-22 NOTE — TELEPHONE ENCOUNTER
Panel Management Review      Patient has the following on her problem list:     Asthma review     ACT Total Scores 9/7/2018   ACT TOTAL SCORE (Goal Greater than or Equal to 20) 24   In the past 12 months, how many times did you visit the emergency room for your asthma without being admitted to the hospital? 0   In the past 12 months, how many times were you hospitalized overnight because of your asthma? 0      1. Is Asthma diagnosis on the Problem List? Yes    2. Is Asthma listed on Health Maintenance? Yes    3. Patient is due for:  ACT and AAP    Hypertension   Last three blood pressure readings:  BP Readings from Last 3 Encounters:   09/07/18 (!) 126/92   01/19/18 (!) 154/96   11/10/17 118/80     Blood pressure: FAILED    HTN Guidelines:  Age 18-59 BP range:  Less than 140/90  Age 60-85 with Diabetes:  Less than 140/90  Age 60-85 without Diabetes:  less than 150/90      Composite cancer screening  Chart review shows that this patient is due/due soon for the following None  Summary:    Patient is due/failing the following:   ACT and PHYSICAL    Action needed:   Patient needs office visit for wellness exam around 03/07/19.    Type of outreach:    Sent [employer] message.    Questions for provider review:    None                                                                                                                                    Lisa Magill, ALEXANDER       Chart routed to Care Team .

## 2019-03-01 DIAGNOSIS — I10 HYPERTENSION, GOAL BELOW 140/90: ICD-10-CM

## 2019-03-01 RX ORDER — LISINOPRIL 10 MG/1
10 TABLET ORAL DAILY
Qty: 90 TABLET | Refills: 1 | Status: SHIPPED | OUTPATIENT
Start: 2019-03-01 | End: 2019-12-07

## 2019-03-01 NOTE — TELEPHONE ENCOUNTER
Assisted patient in scheduling a physical appointment for 04/10/19.  She thinks she might run out of the LISINOPRIL 10 mg before the appointment.      Last OV appointment: 09/07/18  Reason for last OV appointment: blood pressure  Last medication refill:  02/05/19    Patient requesting a refill of the medication.    Lisa Magill, CMA

## 2019-04-12 ENCOUNTER — OFFICE VISIT (OUTPATIENT)
Dept: FAMILY MEDICINE | Facility: CLINIC | Age: 44
End: 2019-04-12
Payer: COMMERCIAL

## 2019-04-12 VITALS
SYSTOLIC BLOOD PRESSURE: 130 MMHG | WEIGHT: 162 LBS | HEIGHT: 68 IN | HEART RATE: 64 BPM | DIASTOLIC BLOOD PRESSURE: 88 MMHG | BODY MASS INDEX: 24.55 KG/M2 | TEMPERATURE: 98.1 F | RESPIRATION RATE: 16 BRPM

## 2019-04-12 DIAGNOSIS — I10 ESSENTIAL HYPERTENSION, BENIGN: ICD-10-CM

## 2019-04-12 DIAGNOSIS — G47.00 INSOMNIA, UNSPECIFIED TYPE: ICD-10-CM

## 2019-04-12 DIAGNOSIS — Z00.00 ROUTINE GENERAL MEDICAL EXAMINATION AT A HEALTH CARE FACILITY: Primary | ICD-10-CM

## 2019-04-12 LAB
ALBUMIN SERPL-MCNC: 4.1 G/DL (ref 3.4–5)
ALP SERPL-CCNC: 71 U/L (ref 40–150)
ALT SERPL W P-5'-P-CCNC: 23 U/L (ref 0–50)
ANION GAP SERPL CALCULATED.3IONS-SCNC: 8 MMOL/L (ref 3–14)
AST SERPL W P-5'-P-CCNC: 24 U/L (ref 0–45)
BILIRUB SERPL-MCNC: 0.5 MG/DL (ref 0.2–1.3)
BUN SERPL-MCNC: 7 MG/DL (ref 7–30)
CALCIUM SERPL-MCNC: 9.5 MG/DL (ref 8.5–10.1)
CHLORIDE SERPL-SCNC: 111 MMOL/L (ref 94–109)
CO2 SERPL-SCNC: 24 MMOL/L (ref 20–32)
CREAT SERPL-MCNC: 0.64 MG/DL (ref 0.52–1.04)
ERYTHROCYTE [DISTWIDTH] IN BLOOD BY AUTOMATED COUNT: 12 % (ref 10–15)
GFR SERPL CREATININE-BSD FRML MDRD: >90 ML/MIN/{1.73_M2}
GLUCOSE SERPL-MCNC: 88 MG/DL (ref 70–99)
HCT VFR BLD AUTO: 45 % (ref 35–47)
HGB BLD-MCNC: 15.6 G/DL (ref 11.7–15.7)
MCH RBC QN AUTO: 31.1 PG (ref 26.5–33)
MCHC RBC AUTO-ENTMCNC: 34.7 G/DL (ref 31.5–36.5)
MCV RBC AUTO: 90 FL (ref 78–100)
PLATELET # BLD AUTO: 238 10E9/L (ref 150–450)
POTASSIUM SERPL-SCNC: 4.7 MMOL/L (ref 3.4–5.3)
PROT SERPL-MCNC: 7.3 G/DL (ref 6.8–8.8)
RBC # BLD AUTO: 5.02 10E12/L (ref 3.8–5.2)
SODIUM SERPL-SCNC: 143 MMOL/L (ref 133–144)
TSH SERPL DL<=0.005 MIU/L-ACNC: 0.94 MU/L (ref 0.4–4)
WBC # BLD AUTO: 5.8 10E9/L (ref 4–11)

## 2019-04-12 PROCEDURE — 82306 VITAMIN D 25 HYDROXY: CPT | Performed by: PHYSICIAN ASSISTANT

## 2019-04-12 PROCEDURE — 85027 COMPLETE CBC AUTOMATED: CPT | Performed by: PHYSICIAN ASSISTANT

## 2019-04-12 PROCEDURE — 84443 ASSAY THYROID STIM HORMONE: CPT | Performed by: PHYSICIAN ASSISTANT

## 2019-04-12 PROCEDURE — 36415 COLL VENOUS BLD VENIPUNCTURE: CPT | Performed by: PHYSICIAN ASSISTANT

## 2019-04-12 PROCEDURE — 99396 PREV VISIT EST AGE 40-64: CPT | Performed by: PHYSICIAN ASSISTANT

## 2019-04-12 PROCEDURE — 99213 OFFICE O/P EST LOW 20 MIN: CPT | Mod: 25 | Performed by: PHYSICIAN ASSISTANT

## 2019-04-12 PROCEDURE — 80053 COMPREHEN METABOLIC PANEL: CPT | Performed by: PHYSICIAN ASSISTANT

## 2019-04-12 RX ORDER — TRAZODONE HYDROCHLORIDE 50 MG/1
50 TABLET, FILM COATED ORAL AT BEDTIME
Qty: 30 TABLET | Refills: 1 | Status: SHIPPED | OUTPATIENT
Start: 2019-04-12 | End: 2020-06-17 | Stop reason: SINTOL

## 2019-04-12 ASSESSMENT — ENCOUNTER SYMPTOMS
EYE PAIN: 0
CONSTIPATION: 0
ABDOMINAL PAIN: 0
HEMATOCHEZIA: 0
HEMATURIA: 0
COUGH: 0
DIARRHEA: 0
DIZZINESS: 0
CHILLS: 0

## 2019-04-12 ASSESSMENT — MIFFLIN-ST. JEOR: SCORE: 1443.33

## 2019-04-12 NOTE — PROGRESS NOTES
"   SUBJECTIVE:   CC: Toshia Hebetr is an 43 year old woman who presents for preventive health visit.     Healthy Habits:     Getting at least 3 servings of Calcium per day:  Yes    Bi-annual eye exam:  Yes    Dental care twice a year:  Yes    Sleep apnea or symptoms of sleep apnea:  Daytime drowsiness    Diet:  Other    Frequency of exercise:  4-5 days/week    Duration of exercise:  45-60 minutes    Taking medications regularly:  Yes    Medication side effects:  No significant flushing    PHQ-2 Total Score: 0    Additional concerns today:  Yes    Patient here for a physical today.  A few concerns.  No need for PAP, hysterectomy.      Insomnia      Duration: couple months    Description  Frequency of insomnia:  nightly  Time to fall asleep: 30 minutes  Middle of night awakening:  nightly  Early morning awakening:  nightly    Accompanying signs and symptoms:  excessive daytime sleepiness and morning headache    History  Similar episodes in past:  YES  Previous evaluation/sleep study:  no     Precipitating or alleviating factors:  New stressful situation: YES- dealing with ex-spouse, custody baeza  Caffeine intake after lunchtime: no   OTC decongestants: no   Any new medications: no     Therapies tried and outcome: alcohol and Melatonin    Struggling with insomnia.  Able to fall asleep, but not stay asleep  Wakes up around 3-4:00am.  Taking 10mg melatonin for sleep nightly which helps fall asleep, but not stay asleep.  Does \"shift work\" as a  so sometimes she works until 12:00-1:00am.  States she wakes up earlier than she needs to and then unable to fall back asleep.  Recently gave up alcohol and feels sleep is worse now.      Requesting a blood pressure study.  States she is active & recently lost weight.  States she eats very healthy diet.  Occasionally becomes dizzy & concerned about low blood pressure. Says her medication has been increased before but the systolic number goes too low and she gets dizzy.  " Unsure why she has hypertension.  Mentioned father had renal artery stenosis.  When this resolved, his hypertension resolved. States she had postpartum preeclampsia and bp has not improved since-been 10 years since preeclampsia.     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 She will work on finding TD immunization records.      Today's PHQ-2 Score:   PHQ-2 (  [employer]) 2019   Q1: Little interest or pleasure in doing things 0   Q2: Feeling down, depressed or hopeless 0   PHQ-2 Score 0   Q1: Little interest or pleasure in doing things Not at all   Q2: Feeling down, depressed or hopeless Not at all   PHQ-2 Score 0       Abuse: Current or Past(Physical, Sexual or Emotional)- No  Do you feel safe in your environment? Yes    Social History     Tobacco Use     Smoking status: Former Smoker     Last attempt to quit: 2005     Years since quittin.2     Smokeless tobacco: Never Used     Tobacco comment: socially   Substance Use Topics     Alcohol use: Yes     Comment: ocasionally       Alcohol Use 2019   Prescreen: >3 drinks/day or >7 drinks/week? Not Applicable   Prescreen: >3 drinks/day or >7 drinks/week? -     Reviewed orders with patient.  Reviewed health maintenance and updated orders accordingly - Yes  Labs reviewed in [facility]    Mammogram Screening: Patient under age 50, mutual decision reflected in health maintenance.      Pertinent mammograms are reviewed under the imaging tab.  History of abnormal Pap smear: Status post benign hysterectomy. Health Maintenance and Surgical History updated.  PAP / HPV 2012   PAP NIL NIL NIL     Reviewed and updated as needed this visit by clinical staff  Reviewed and updated as needed this visit by Provider        Past Medical History:   Diagnosis Date     Dysmenorrhea      Hypertension 2009     Incontinence of urine      Temporomandibular joint disorder       Past Surgical History:   Procedure Laterality Date     LAPAROSCOPIC  HYSTERECTOMY TOTAL  2/5/2013    Procedure: LAPAROSCOPIC HYSTERECTOMY TOTAL;  LAPAROSCOPIC HYSTERECTOMY TOTAL and Bilateral Fallopian Tubes and Cystoscopy;  Surgeon: Yuan Chun MD;  Location: [facility] OR     LEEP TX, CERVICAL  10/2006    abnormal pap smear     SLING TRANSPUBO WITH ANTERIOR COLPORRHAPHY, COMBINED  12/16/2013    Procedure: COMBINED SLING TRANSPUBO WITH ANTERIOR COLPORRHAPHY;  Pubo Vaginal Sling ([place]);  Surgeon: Lino Robles MD;  Location: [facility] OR     TMJ surgery       Uterine ablation  09/2011     Review of Systems   Constitutional: Negative for chills.   HENT: Positive for congestion. Negative for ear pain.    Eyes: Negative for pain.   Respiratory: Negative for cough.    Cardiovascular: Negative for chest pain.   Gastrointestinal: Negative for abdominal pain, constipation, diarrhea and hematochezia.   Genitourinary: Negative for hematuria.   Neurological: Negative for dizziness.     CONSTITUTIONAL: NEGATIVE for fever, chills, change in weight  INTEGUMENTARU/SKIN: NEGATIVE for worrisome rashes, moles or lesions  EYES: NEGATIVE for vision changes or irritation  ENT: NEGATIVE for ear, mouth and throat problems  RESP: NEGATIVE for significant cough or SOB  BREAST: NEGATIVE for masses, tenderness or discharge  CV: NEGATIVE for chest pain, palpitations or peripheral edema  GI: NEGATIVE for nausea, abdominal pain, heartburn, or change in bowel habits  : NEGATIVE for unusual urinary or vaginal symptoms. Periods are regular.  MUSCULOSKELETAL: NEGATIVE for significant arthralgias or myalgia  NEURO: NEGATIVE for weakness, dizziness or paresthesias  PSYCHIATRIC: NEGATIVE for changes in mood or affect     OBJECTIVE:   LMP 01/25/2013   Physical Exam  GENERAL: healthy, alert and no distress  EYES: Eyes grossly normal to inspection, PERRL and conjunctivae and sclerae normal  HENT: ear canals and TM's normal, nose and mouth without ulcers or lesions  NECK: no adenopathy, no asymmetry, masses, or scars and  "thyroid normal to palpation  RESP: lungs clear to auscultation - no rales, rhonchi or wheezes  BREAST: normal without masses, tenderness or nipple discharge and no palpable axillary masses or adenopathy  CV: regular rate and rhythm, normal S1 S2, no S3 or S4, no murmur, click or rub, no peripheral edema and peripheral pulses strong  ABDOMEN: soft, nontender, no hepatosplenomegaly, no masses and bowel sounds normal  MS: no gross musculoskeletal defects noted, no edema  SKIN: no suspicious lesions or rashes  NEURO: Normal strength and tone, mentation intact and speech normal  PSYCH: mentation appears normal, affect normal/bright    Diagnostic Test Results: none     ASSESSMENT/PLAN:   1. Routine general medical examination at a health care facility  Labs completed- not fasting today, last lipids were WNL.  - CBC with platelets  - Comprehensive metabolic panel  - Vitamin D Deficiency  - TSH with free T4 reflex    2. Essential hypertension, benign  Referral given to nephrology for her to ask questions and see what could be appropriate studies/work up.  - CBC with platelets  - Comprehensive metabolic panel  - TSH with free T4 reflex  - NEPHROLOGY ADULT REFERRAL    3. Insomnia, unspecified type  Willing to try Trazodone.  Discussed mechanism of action of medication, proper dosing, potential side effects and appropriate follow up.    - traZODone (DESYREL) 50 MG tablet; Take 1 tablet (50 mg) by mouth At Bedtime  Dispense: 30 tablet; Refill: 1    COUNSELING:  Reviewed preventive health counseling, as reflected in patient instructions    BP Readings from Last 1 Encounters:   09/07/18 (!) 126/92     Estimated body mass index is 25.82 kg/m  as calculated from the following:    Height as of 9/7/18: 1.727 m (5' 8\").    Weight as of 9/7/18: 77 kg (169 lb 12.8 oz).           reports that she quit smoking about 14 years ago. She has never used smokeless tobacco.    Counseling Resources:  ATP IV Guidelines  Pooled Cohorts Equation " Calculator  Breast Cancer Risk Calculator  FRAX Risk Assessment  ICSI Preventive Guidelines  Dietary Guidelines for Americans, 2010  USDA's MyPlate  ASA Prophylaxis  Lung CA Screening    MANUEL MENON, NP Student    Samy Bell PA-C  [facility]

## 2019-04-13 LAB — DEPRECATED CALCIDIOL+CALCIFEROL SERPL-MC: 13 UG/L (ref 20–75)

## 2019-04-13 ASSESSMENT — ASTHMA QUESTIONNAIRES: ACT_TOTALSCORE: 25

## 2019-11-05 ENCOUNTER — HEALTH MAINTENANCE LETTER (OUTPATIENT)
Age: 44
End: 2019-11-05

## 2019-11-12 NOTE — TELEPHONE ENCOUNTER
Last Written Prescription Date:  9.7.18  Last Fill Quantity: 30,  # refills: 0   Last office visit: 4/12/2019 with prescribing provider:  Ray   Future Office Visit:      Requested Prescriptions   Pending Prescriptions Disp Refills     butalbital-acetaminophen-caffeine (FIORICET/ESGIC) -40 MG tablet [Pharmacy Med Name: CIMDLB-[id number] -40] 30 tablet 0     Sig: TAKE ONE TABLET BY MOUTH EVEYR 4 HOURS AS NEEDED       There is no refill protocol information for this order         Return in about 6 months (around 10/12/2019) for Med Check, Follow up.       This has not been prescribed in over a year, due for appt  Please schedule appt

## 2019-12-07 DIAGNOSIS — I10 HYPERTENSION, GOAL BELOW 140/90: ICD-10-CM

## 2019-12-09 NOTE — TELEPHONE ENCOUNTER
Office Visit  Return in about 6 months (around 10/12/2019) for Med Check, Follow up.      Please schedule appt

## 2019-12-11 RX ORDER — LISINOPRIL 10 MG/1
TABLET ORAL
Qty: 90 TABLET | Refills: 0 | Status: SHIPPED | OUTPATIENT
Start: 2019-12-11 | End: 2020-06-10

## 2019-12-17 RX ORDER — BUTALBITAL, ACETAMINOPHEN AND CAFFEINE 50; 325; 40 MG/1; MG/1; MG/1
TABLET ORAL
Qty: 30 TABLET | Refills: 0 | OUTPATIENT
Start: 2019-12-17

## 2019-12-17 NOTE — TELEPHONE ENCOUNTER
Routing refill request to provider for review/approval because:  This has been in RF basket since 11/12/19    Pt is due for f/u -  LM again today for pt to call clinic.   No ehsan RF done.     Unable to check  provider has fallen off list.     Sarah Chavarria, RN

## 2020-03-17 DIAGNOSIS — R51.9 NONINTRACTABLE HEADACHE, UNSPECIFIED CHRONICITY PATTERN, UNSPECIFIED HEADACHE TYPE: ICD-10-CM

## 2020-03-17 RX ORDER — BUTALBITAL, ACETAMINOPHEN AND CAFFEINE 50; 325; 40 MG/1; MG/1; MG/1
1 TABLET ORAL EVERY 4 HOURS PRN
Qty: 30 TABLET | Refills: 0 | Status: SHIPPED | OUTPATIENT
Start: 2020-03-17 | End: 2020-06-17

## 2020-03-17 NOTE — TELEPHONE ENCOUNTER
Medication Question or Refill  Who is calling: patient  What medication are you calling about (include dose and sig)?:   butalbital-acetaminophen-caffeine (FIORICET/ESGIC) -40 MG per tablet  30 tablet  0  9/7/2018   No    Sig - Route: Take 1 tablet by mouth every 4 hours as needed - Oral    Class: Local Print          Controlled Substance Agreement on file:   Who prescribed the medication?:Franklin  Do you need a refill? Yes:   When did you use the medication last?   Patient offered an appointment? No  Do you have any questions or concerns?  No  Requested [facility]  Okay to leave a detailed message?: Yes at Home number on file [phone] (home)        Casie Ascencio

## 2020-03-17 NOTE — TELEPHONE ENCOUNTER
Rx was denied in November 2019 as pt had not been seen in over a year.  Staff called, sent mychart,, and sent a letter with no response from patient      [place] reviewed:  reviewed- no concerns    [place]:  [email or web address]      Controlled Substance Refill Request for Fioricet  Problem List Complete:  No     PROVIDER TO CONSIDER COMPLETION OF PROBLEM LIST AND OVERVIEW/CONTROLLED SUBSTANCE AGREEMENT    Last Written Prescription Date:  9/7/18  Last Fill Quantity: 30,   # refills: 0        Last Office Visit with [facility] primary care provider: 9/7/2018    Future Office visit:     Controlled substance agreement:   Encounter-Level CSA:    There are no encounter-level csa.     Patient-Level CSA:    There are no patient-level csa.         Last Urine Drug Screen: No results found for: CDAUT, No results found for: COMDAT, No results found for: THC13, PCP13, COC13, MAMP13, OPI13, AMP13, BZO13, TCA13, MTD13, BAR13, OXY13, PPX13, BUP13     Processing:  Rx to be electronically transmitted to pharmacy by provider      [email or web address]       checked in past 3 months?  Yes 3/17/2020

## 2020-06-03 DIAGNOSIS — I10 HYPERTENSION, GOAL BELOW 140/90: ICD-10-CM

## 2020-06-03 NOTE — TELEPHONE ENCOUNTER
Patient overdue for medication check appointment. Please outreach to set up Virtual Visit.     If patient needs refill before the appointment, please route back to  REFILL pool.

## 2020-06-08 NOTE — TELEPHONE ENCOUNTER
Ok to deny at this time?  Pt has been contacted multiple times with no response  Vj Gregg RN, BSN

## 2020-06-10 RX ORDER — LISINOPRIL 10 MG/1
TABLET ORAL
Qty: 15 TABLET | Refills: 0 | Status: SHIPPED | OUTPATIENT
Start: 2020-06-10 | End: 2020-06-17

## 2020-06-10 NOTE — TELEPHONE ENCOUNTER
Informed patient and also assisted patient in scheduling a video visit appointment on 06/17/2020.  Lisa Magill, CMA

## 2020-06-17 ENCOUNTER — VIRTUAL VISIT (OUTPATIENT)
Dept: FAMILY MEDICINE | Facility: CLINIC | Age: 45
End: 2020-06-17
Payer: COMMERCIAL

## 2020-06-17 DIAGNOSIS — J45.990 EXERCISE-INDUCED ASTHMA: ICD-10-CM

## 2020-06-17 DIAGNOSIS — I10 HYPERTENSION, GOAL BELOW 140/90: Primary | ICD-10-CM

## 2020-06-17 DIAGNOSIS — R51.9 NONINTRACTABLE HEADACHE, UNSPECIFIED CHRONICITY PATTERN, UNSPECIFIED HEADACHE TYPE: ICD-10-CM

## 2020-06-17 PROCEDURE — 99214 OFFICE O/P EST MOD 30 MIN: CPT | Mod: 95 | Performed by: FAMILY MEDICINE

## 2020-06-17 RX ORDER — HYDROCHLOROTHIAZIDE 12.5 MG/1
12.5 TABLET ORAL DAILY
Qty: 90 TABLET | Refills: 1 | Status: SHIPPED | OUTPATIENT
Start: 2020-06-17 | End: 2020-12-19

## 2020-06-17 RX ORDER — BUTALBITAL, ACETAMINOPHEN AND CAFFEINE 50; 325; 40 MG/1; MG/1; MG/1
1 TABLET ORAL EVERY 4 HOURS PRN
Qty: 30 TABLET | Refills: 0 | Status: SHIPPED | OUTPATIENT
Start: 2020-06-17

## 2020-06-17 RX ORDER — LISINOPRIL 10 MG/1
10 TABLET ORAL DAILY
Qty: 90 TABLET | Refills: 1 | Status: SHIPPED | OUTPATIENT
Start: 2020-06-17

## 2020-06-17 NOTE — PROGRESS NOTES
"Toshia Hebert is a 45 year old female who is being evaluated via a billable video visit.      The patient has been notified of following:     \"This video visit will be conducted via a call between you and your physician/provider. We have found that certain health care needs can be provided without the need for an in-person physical exam.  This service lets us provide the care you need with a video conversation.  If a prescription is necessary we can send it directly to your pharmacy.  If lab work is needed we can place an order for that and you can then stop by our lab to have the test done at a later time.    Video visits are billed at different rates depending on your insurance coverage.  Please reach out to your insurance provider with any questions.    If during the course of the call the physician/provider feels a video visit is not appropriate, you will not be charged for this service.\"    Patient has given verbal consent for Video visit? Yes    Will anyone else be joining your video visit? No    Subjective     Toshia Hebert is a 45 year old female who presents today via video visit for the following health issues:    HPI  Hypertension Follow-up      Do you check your blood pressure regularly outside of the clinic? Yes     Are you following a low salt diet? No    Are your blood pressures ever more than 140 on the top number (systolic) OR more   than 90 on the bottom number (diastolic), for example 140/90? Yes      How many servings of fruits and vegetables do you eat daily?  4 or more    On average, how many sweetened beverages do you drink each day (Examples: soda, juice, sweet tea, etc.  Do NOT count diet or artificially sweetened beverages)?   0    How many days per week do you exercise enough to make your heart beat faster? 7    How many minutes a day do you exercise enough to make your heart beat faster? 30 - 60  How many days per week do you miss taking your medication? 3    What makes it hard for " "you to take your medications?  side effects    Drinking ETOH a bit more, and sitting more, since laid off.  3 or more per day. Talking with neighbors in the evening. Socializing more.    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 Usually eats very healthy, gardens.  131/100, 139/103, 136/100, 149/106    Migraines on and off, she used to get migraines with HTN. Fioricet, 30 tabs will last her 6 months, ibu does not help much, so she will only take it when she cannot take the fioricet. 2-3 times per month.    Insomnia-no longer taking trazadone, did not help at all, took it for 2 weeks, so stopped. Mag citrate will help to relax her and occasionally would help with constipation.    Asthma-doing well, rarely needs it. Usually uses it for exercise and not doing that now.         Video Start Time: 11:02 AM    PROBLEMS TO ADD ON...    BP Readings from Last 3 Encounters:   04/12/19 130/88   09/07/18 (!) 126/92   01/19/18 (!) 154/96    Wt Readings from Last 3 Encounters:   04/12/19 73.5 kg (162 lb)   09/07/18 77 kg (169 lb 12.8 oz)   01/19/18 74.8 kg (165 lb)                    Reviewed and updated as needed this visit by Provider         Review of Systems   Constitutional, HEENT, cardiovascular, pulmonary, gi and gu systems are negative, except as otherwise noted.      Objective    LMP 01/25/2013   Estimated body mass index is 24.63 kg/m  as calculated from the following:    Height as of 4/12/19: 1.727 m (5' 8\").    Weight as of 4/12/19: 73.5 kg (162 lb).  Physical Exam     GENERAL: Healthy, alert and no distress  EYES: Eyes grossly normal to inspection.  No discharge or erythema, or obvious scleral/conjunctival abnormalities.  RESP: No audible wheeze, cough, or visible cyanosis.  No visible retractions or increased work of breathing.    SKIN: Visible skin clear. No significant rash, abnormal pigmentation or lesions.  NEURO: Cranial nerves grossly intact.  Mentation and speech appropriate for age.  PSYCH: " Mentation appears normal, affect normal/bright, judgement and insight intact, normal speech and appearance well-groomed.      Diagnostic Test Results:  Labs reviewed in Epic        Assessment & Plan     1. Hypertension, goal below 140/90  Poor control, pt will be making lifestyle changes, stopping drinking and going back to work, which will also get her steps up. Refilled med and added hydrochlorothiazide. In the past this combo caused her to feel dizzy, she will monitor her BP and sx and let me know if we need to change it  - lisinopril (ZESTRIL) 10 MG tablet; Take 1 tablet (10 mg) by mouth daily  Dispense: 15 tablet; Refill: 0    2. Nonintractable headache, unspecified chronicity pattern, unspecified headache type  Refilled, doing ok, stable condition, is not over using med  - butalbital-acetaminophen-caffeine (ESGIC) -40 MG tablet; Take 1 tablet by mouth every 4 hours as needed  Dispense: 30 tablet; Refill: 0    3. Exercise-induced asthma  Stable, no concerns, ACT 25         Regular exercise    Return in about 6 months (around 12/17/2020).    Dimple Reid MD  [facility]      Video-Visit Details    Type of service:  Video Visit    Video End Time:11:18 AM    Originating Location (pt. Location): Home    Distant Location (provider location):  [facility]     Platform used for Video Visit: AmWell    Return in about 6 months (around 12/17/2020).       Dimple Reid MD

## 2020-06-18 ASSESSMENT — ASTHMA QUESTIONNAIRES: ACT_TOTALSCORE: 25

## 2020-11-22 ENCOUNTER — HEALTH MAINTENANCE LETTER (OUTPATIENT)
Age: 45
End: 2020-11-22

## 2020-12-17 DIAGNOSIS — I10 HYPERTENSION, GOAL BELOW 140/90: ICD-10-CM

## 2020-12-17 NOTE — LETTER
December 28, 2020      Toshia Hebert  [address] [address]        Dear Ms. Lewispilar FLORIAN Anup,    We are contacting you today to notify you that you are due for a medication follow up for further refills for your hydrochlorothiazide .    This appointment can be in clinic or virtual by either telephone, video.    Please call [phone] to schedule an appointment.     [facility]      Sincerely,     Dimple Reid MD

## 2020-12-19 RX ORDER — HYDROCHLOROTHIAZIDE 12.5 MG/1
TABLET ORAL
Qty: 90 TABLET | Refills: 0 | Status: SHIPPED | OUTPATIENT
Start: 2020-12-19

## 2021-07-25 ENCOUNTER — HEALTH MAINTENANCE LETTER (OUTPATIENT)
Age: 46
End: 2021-07-25

## 2021-09-19 ENCOUNTER — HEALTH MAINTENANCE LETTER (OUTPATIENT)
Age: 46
End: 2021-09-19

## 2022-01-09 ENCOUNTER — HEALTH MAINTENANCE LETTER (OUTPATIENT)
Age: 47
End: 2022-01-09

## 2022-08-21 ENCOUNTER — HEALTH MAINTENANCE LETTER (OUTPATIENT)
Age: 47
End: 2022-08-21

## 2022-11-20 ENCOUNTER — HEALTH MAINTENANCE LETTER (OUTPATIENT)
Age: 47
End: 2022-11-20

## 2023-02-16 ENCOUNTER — HOSPITAL ENCOUNTER (EMERGENCY)
Facility: CLINIC | Age: 48
Discharge: HOME | End: 2023-02-17
Attending: EMERGENCY MEDICINE | Admitting: EMERGENCY MEDICINE
Payer: COMMERCIAL

## 2023-02-16 ENCOUNTER — APPOINTMENT (OUTPATIENT)
Dept: CT IMAGING | Facility: CLINIC | Age: 48
End: 2023-02-16
Attending: EMERGENCY MEDICINE
Payer: COMMERCIAL

## 2023-02-16 ENCOUNTER — APPOINTMENT (OUTPATIENT)
Dept: ULTRASOUND IMAGING | Facility: CLINIC | Age: 48
End: 2023-02-16
Attending: EMERGENCY MEDICINE
Payer: COMMERCIAL

## 2023-02-16 DIAGNOSIS — K85.20 ALCOHOL-INDUCED ACUTE PANCREATITIS, UNSPECIFIED COMPLICATION STATUS: ICD-10-CM

## 2023-02-16 LAB
ALBUMIN SERPL BCG-MCNC: 4.7 G/DL (ref 3.5–5.2)
ALBUMIN UR-MCNC: NEGATIVE MG/DL
ALP SERPL-CCNC: 83 U/L (ref 35–104)
ALT SERPL W P-5'-P-CCNC: 16 U/L (ref 10–35)
ANION GAP SERPL CALCULATED.3IONS-SCNC: 10 MMOL/L (ref 7–15)
APPEARANCE UR: CLEAR
AST SERPL W P-5'-P-CCNC: 20 U/L (ref 10–35)
BASOPHILS # BLD AUTO: 0.1 10E3/UL (ref 0–0.2)
BASOPHILS NFR BLD AUTO: 1 %
BILIRUB SERPL-MCNC: 0.3 MG/DL
BILIRUB UR QL STRIP: NEGATIVE
BUN SERPL-MCNC: 10.1 MG/DL (ref 6–20)
CALCIUM SERPL-MCNC: 9.9 MG/DL (ref 8.6–10)
CHLORIDE SERPL-SCNC: 103 MMOL/L (ref 98–107)
COLOR UR AUTO: NORMAL
CREAT SERPL-MCNC: 0.71 MG/DL (ref 0.51–0.95)
DEPRECATED HCO3 PLAS-SCNC: 25 MMOL/L (ref 22–29)
EOSINOPHIL # BLD AUTO: 1.8 10E3/UL (ref 0–0.7)
EOSINOPHIL NFR BLD AUTO: 16 %
ERYTHROCYTE [DISTWIDTH] IN BLOOD BY AUTOMATED COUNT: 11.8 % (ref 10–15)
GFR SERPL CREATININE-BSD FRML MDRD: >90 ML/MIN/1.73M2
GLUCOSE SERPL-MCNC: 125 MG/DL (ref 70–99)
GLUCOSE UR STRIP-MCNC: NEGATIVE MG/DL
HCT VFR BLD AUTO: 43.7 % (ref 35–47)
HGB BLD-MCNC: 15.3 G/DL (ref 11.7–15.7)
HGB UR QL STRIP: NEGATIVE
HOLD SPECIMEN: NORMAL
IMM GRANULOCYTES # BLD: 0 10E3/UL
IMM GRANULOCYTES NFR BLD: 0 %
KETONES UR STRIP-MCNC: NEGATIVE MG/DL
LACTATE SERPL-SCNC: 1.2 MMOL/L (ref 0.7–2)
LEUKOCYTE ESTERASE UR QL STRIP: NEGATIVE
LIPASE SERPL-CCNC: 371 U/L (ref 13–60)
LYMPHOCYTES # BLD AUTO: 2.2 10E3/UL (ref 0.8–5.3)
LYMPHOCYTES NFR BLD AUTO: 20 %
MCH RBC QN AUTO: 30.7 PG (ref 26.5–33)
MCHC RBC AUTO-ENTMCNC: 35 G/DL (ref 31.5–36.5)
MCV RBC AUTO: 88 FL (ref 78–100)
MONOCYTES # BLD AUTO: 0.7 10E3/UL (ref 0–1.3)
MONOCYTES NFR BLD AUTO: 6 %
NEUTROPHILS # BLD AUTO: 6.3 10E3/UL (ref 1.6–8.3)
NEUTROPHILS NFR BLD AUTO: 57 %
NITRATE UR QL: NEGATIVE
NRBC # BLD AUTO: 0 10E3/UL
NRBC BLD AUTO-RTO: 0 /100
PH UR STRIP: 6 [PH] (ref 5–7)
PLATELET # BLD AUTO: 247 10E3/UL (ref 150–450)
POTASSIUM SERPL-SCNC: 3.8 MMOL/L (ref 3.4–5.3)
PROT SERPL-MCNC: 7.2 G/DL (ref 6.4–8.3)
RBC # BLD AUTO: 4.99 10E6/UL (ref 3.8–5.2)
SODIUM SERPL-SCNC: 138 MMOL/L (ref 136–145)
SP GR UR STRIP: 1.01 (ref 1–1.03)
UROBILINOGEN UR STRIP-MCNC: NORMAL MG/DL
WBC # BLD AUTO: 11.1 10E3/UL (ref 4–11)

## 2023-02-16 PROCEDURE — 81003 URINALYSIS AUTO W/O SCOPE: CPT | Performed by: EMERGENCY MEDICINE

## 2023-02-16 PROCEDURE — 80053 COMPREHEN METABOLIC PANEL: CPT | Performed by: EMERGENCY MEDICINE

## 2023-02-16 PROCEDURE — 250N000011 HC RX IP 250 OP 636: Performed by: EMERGENCY MEDICINE

## 2023-02-16 PROCEDURE — 76705 ECHO EXAM OF ABDOMEN: CPT

## 2023-02-16 PROCEDURE — 83690 ASSAY OF LIPASE: CPT | Performed by: EMERGENCY MEDICINE

## 2023-02-16 PROCEDURE — 258N000003 HC RX IP 258 OP 636: Performed by: EMERGENCY MEDICINE

## 2023-02-16 PROCEDURE — 96374 THER/PROPH/DIAG INJ IV PUSH: CPT | Mod: 59

## 2023-02-16 PROCEDURE — 85025 COMPLETE CBC W/AUTO DIFF WBC: CPT | Performed by: EMERGENCY MEDICINE

## 2023-02-16 PROCEDURE — 96375 TX/PRO/DX INJ NEW DRUG ADDON: CPT | Mod: 59

## 2023-02-16 PROCEDURE — 83605 ASSAY OF LACTIC ACID: CPT | Performed by: EMERGENCY MEDICINE

## 2023-02-16 PROCEDURE — 85004 AUTOMATED DIFF WBC COUNT: CPT | Performed by: EMERGENCY MEDICINE

## 2023-02-16 PROCEDURE — 96376 TX/PRO/DX INJ SAME DRUG ADON: CPT

## 2023-02-16 PROCEDURE — 36415 COLL VENOUS BLD VENIPUNCTURE: CPT | Performed by: EMERGENCY MEDICINE

## 2023-02-16 PROCEDURE — 99285 EMERGENCY DEPT VISIT HI MDM: CPT | Mod: 25

## 2023-02-16 PROCEDURE — 96361 HYDRATE IV INFUSION ADD-ON: CPT

## 2023-02-16 PROCEDURE — 250N000009 HC RX 250: Performed by: EMERGENCY MEDICINE

## 2023-02-16 PROCEDURE — 74177 CT ABD & PELVIS W/CONTRAST: CPT

## 2023-02-16 RX ORDER — IOPAMIDOL 755 MG/ML
88 INJECTION, SOLUTION INTRAVASCULAR ONCE
Status: COMPLETED | OUTPATIENT
Start: 2023-02-16 | End: 2023-02-16

## 2023-02-16 RX ORDER — ONDANSETRON 2 MG/ML
4 INJECTION INTRAMUSCULAR; INTRAVENOUS ONCE
Status: COMPLETED | OUTPATIENT
Start: 2023-02-16 | End: 2023-02-16

## 2023-02-16 RX ORDER — HYDROCODONE BITARTRATE AND ACETAMINOPHEN 5; 325 MG/1; MG/1
1 TABLET ORAL EVERY 6 HOURS PRN
Qty: 14 TABLET | Refills: 0 | Status: SHIPPED | OUTPATIENT
Start: 2023-02-16 | End: 2023-02-19

## 2023-02-16 RX ORDER — ONDANSETRON 4 MG/1
4 TABLET, ORALLY DISINTEGRATING ORAL EVERY 8 HOURS PRN
Qty: 10 TABLET | Refills: 0 | Status: SHIPPED | OUTPATIENT
Start: 2023-02-16 | End: 2023-02-19

## 2023-02-16 RX ORDER — MORPHINE SULFATE 4 MG/ML
6 INJECTION, SOLUTION INTRAMUSCULAR; INTRAVENOUS ONCE
Status: COMPLETED | OUTPATIENT
Start: 2023-02-16 | End: 2023-02-16

## 2023-02-16 RX ORDER — ONDANSETRON 2 MG/ML
4 INJECTION INTRAMUSCULAR; INTRAVENOUS EVERY 30 MIN PRN
Status: DISCONTINUED | OUTPATIENT
Start: 2023-02-16 | End: 2023-02-17 | Stop reason: HOSPADM

## 2023-02-16 RX ORDER — MORPHINE SULFATE 4 MG/ML
6 INJECTION, SOLUTION INTRAMUSCULAR; INTRAVENOUS
Status: COMPLETED | OUTPATIENT
Start: 2023-02-16 | End: 2023-02-16

## 2023-02-16 RX ORDER — SODIUM CHLORIDE 9 MG/ML
INJECTION, SOLUTION INTRAVENOUS CONTINUOUS
Status: DISCONTINUED | OUTPATIENT
Start: 2023-02-16 | End: 2023-02-17 | Stop reason: HOSPADM

## 2023-02-16 RX ADMIN — ONDANSETRON 4 MG: 2 INJECTION INTRAMUSCULAR; INTRAVENOUS at 20:42

## 2023-02-16 RX ADMIN — MORPHINE SULFATE 6 MG: 4 INJECTION, SOLUTION INTRAMUSCULAR; INTRAVENOUS at 21:10

## 2023-02-16 RX ADMIN — IOPAMIDOL 88 ML: 755 INJECTION, SOLUTION INTRAVENOUS at 21:23

## 2023-02-16 RX ADMIN — SODIUM CHLORIDE 1000 ML: 9 INJECTION, SOLUTION INTRAVENOUS at 21:10

## 2023-02-16 RX ADMIN — SODIUM CHLORIDE 91 ML: 900 INJECTION INTRAVENOUS at 21:24

## 2023-02-16 RX ADMIN — ONDANSETRON 4 MG: 2 INJECTION INTRAMUSCULAR; INTRAVENOUS at 23:46

## 2023-02-16 RX ADMIN — MORPHINE SULFATE 6 MG: 4 INJECTION, SOLUTION INTRAMUSCULAR; INTRAVENOUS at 21:57

## 2023-02-16 ASSESSMENT — ENCOUNTER SYMPTOMS
NAUSEA: 1
FLANK PAIN: 1
ABDOMINAL PAIN: 0
BACK PAIN: 1
VOMITING: 0

## 2023-02-16 ASSESSMENT — ACTIVITIES OF DAILY LIVING (ADL)
ADLS_ACUITY_SCORE: 35
ADLS_ACUITY_SCORE: 35

## 2023-02-17 VITALS
SYSTOLIC BLOOD PRESSURE: 130 MMHG | DIASTOLIC BLOOD PRESSURE: 91 MMHG | HEIGHT: 69 IN | WEIGHT: 174 LBS | TEMPERATURE: 98.8 F | BODY MASS INDEX: 25.77 KG/M2 | RESPIRATION RATE: 16 BRPM | OXYGEN SATURATION: 98 % | HEART RATE: 76 BPM

## 2023-02-17 NOTE — DISCHARGE INSTRUCTIONS
As we discussed, you do have pancreatitis and I think that is likely the cause of your pain.  Your CT scan was essentially reassuring with no emergent cause found, though of course your pain is significant.  For this reason we have given you narcotics to go home with as well as some antinausea meds.  Please drink only clear liquids today and tomorrow, and advance your diet to thicker liquids and food, starting with bland food, after that.  Come back to the ER if you still have pain and vomiting even despite the medication that we have given you.  I suspect that this is likely related to alcohol use, so please abstain from alcohol for the next several weeks until you talk to your regular doctor as well.  Come back to the ER with any other concerns you have.

## 2023-02-17 NOTE — ED TRIAGE NOTES
"[facility]  ED Arrival Note    Arrived to triage c/o sudden onset severe right sided abdominal pain radiating to the back. Pt states that it feels like a \"squeezing\" sensation making it hard to breath. Pt states that it started  About 5 pm and has progressively worsened.      Visitors during triage: None      Triage Interventions: IV and labs    Ambulatory: Yes    Directed to: Triage Lobby         Triage Assessment     Row Name 02/16/23 2018       Respiratory WDL    Respiratory WDL WDL       Skin Circulation/Temperature WDL    Skin Circulation/Temperature WDL WDL       Cardiac WDL    Cardiac WDL WDL       Peripheral/Neurovascular WDL    Peripheral Neurovascular WDL WDL       Cognitive/Neuro/Behavioral WDL    Cognitive/Neuro/Behavioral WDL WDL              "

## 2023-02-17 NOTE — ED PROVIDER NOTES
"  History     Chief Complaint:  Back pain     HPI   Toshia Hebert is a 47 year old female with a history of HTN who presents with right-sided back pain. She reports that she was driving home from work at 1730 when she felt the back pain manifest out of nowhere. She explains that the pain radiates to her right flank. Toshia has never had any pain similar to this in the past. The patient also endorses nausea. No vomiting or abdominal pain.    She is a  and had several drinks tonight, also endorses several drinks on [place] 2 days ago.      Independent Historian: Patient    Review of External Notes: Yes appreciate past history and office visits    ROS:  Review of Systems   Gastrointestinal: Positive for nausea. Negative for abdominal pain and vomiting.   Genitourinary: Positive for flank pain (right).   Musculoskeletal: Positive for back pain (right).   All other systems reviewed and are negative.    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 She has never used smokeless tobacco. She reports current alcohol use. She reports that she does not use drugs.  Patient arrives by private vehicle alone  PCP: Dimple Reid     Physical Exam     Patient Vitals for the past 24 hrs:   BP Temp Temp src Pulse Resp SpO2 Height Weight   02/16/23 2054 -- -- -- -- -- 98 % -- --   02/16/23 2047 (!) 152/113 -- -- -- -- -- -- --   02/16/23 2021 -- -- -- 88 -- -- -- --   02/16/23 2020 (!) 161/99 98.8  F (37.1  C) Oral -- 20 96 % 1.753 m (5' 9\") 78.9 kg " (174 lb)        Physical Exam  Vitals: reviewed by me  General: Pt seen on hospital [facility], pleasant, cooperative, and alert to conversation.  Appears to be in pain however, nontoxic.  Eyes: Tracking well, clear conjunctiva BL  ENT: MMM, midline trachea.   Lungs: No tachypnea, no accessory muscle use. No respiratory distress.   CV: Rate as above  Abd: Soft, does have significant epigastric tenderness to palpation.  Nontender in the lower quadrant, no CVA tenderness bilaterally, negative Gonzalez sign.  MSK: no joint effusion.  No evidence of trauma  Skin: No rash  Neuro: Clear speech and no facial droop.  Psych: Not RIS, no e/o AH/VH      Emergency Department Course   Imaging:  CT Chest (PE) Abdomen Pelvis w Contrast   Final Result   IMPRESSION:   1.  No acute abnormality in the chest. No evidence for pulmonary embolism.   2.  Ectasia of the ascending thoracic aorta measures 3.8 cm. No evidence for aortic dissection.         Report per radiology    Laboratory:  Labs Ordered and Resulted from Time of ED Arrival to Time of ED Departure   COMPREHENSIVE METABOLIC PANEL - Abnormal       Result Value    Sodium 138      Potassium 3.8      Chloride 103      Carbon Dioxide (CO2) 25      Anion Gap 10      Urea Nitrogen 10.1      Creatinine 0.71      Calcium 9.9      Glucose 125 (*)     Alkaline Phosphatase 83      AST 20      ALT 16      Protein Total 7.2      Albumin 4.7      Bilirubin Total 0.3      GFR Estimate >90     LIPASE - Abnormal    Lipase 371 (*)    CBC WITH PLATELETS AND DIFFERENTIAL - Abnormal    WBC Count 11.1 (*)     RBC Count 4.99      Hemoglobin 15.3      Hematocrit 43.7      MCV 88      MCH 30.7      MCHC 35.0      RDW 11.8      Platelet Count 247      % Neutrophils 57      % Lymphocytes 20      % Monocytes 6      % Eosinophils 16      % Basophils 1      % Immature Granulocytes 0      NRBCs per 100 WBC 0      Absolute Neutrophils 6.3      Absolute Lymphocytes 2.2      Absolute Monocytes 0.7      Absolute  Eosinophils 1.8 (*)     Absolute Basophils 0.1      Absolute Immature Granulocytes 0.0      Absolute NRBCs 0.0     LACTIC ACID WHOLE BLOOD - Normal    Lactic Acid 1.2     UA MACROSCOPIC WITH REFLEX TO MICRO AND CULTURE - Normal    Color Urine Straw      Appearance Urine Clear      Glucose Urine Negative      Bilirubin Urine Negative      Ketones Urine Negative      Specific Gravity Urine 1.008      Blood Urine Negative      pH Urine 6.0      Protein Albumin Urine Negative      Urobilinogen Urine Normal      Nitrite Urine Negative      Leukocyte Esterase Urine Negative            Emergency Department Course & Assessments:        Interventions/Assessments:  2100 I obtained history and examined the patient as noted above  Medications   0.9% sodium chloride BOLUS (has no administration in time range)     Followed by   sodium chloride 0.9% infusion (has no administration in time range)   ondansetron (ZOFRAN) injection 4 mg (has no administration in time range)   morphine (PF) injection 6 mg (has no administration in time range)   ondansetron (ZOFRAN) injection 4 mg (4 mg Intravenous Given 2/16/23 2042)        Disposition:  The patient signed out to my colleague pending right upper quadrant ultrasound  Impression & Plan    Medical Decision Making:  This is a very pleasant 47-year-old female who presents to the emergency room with appears to be pancreatitis.  I suspect that this is likely due to alcohol use, she is a , and did have some drinks at work today.  Her abdomen is benign but of course she does have epigastric tenderness.  Her CT scan is reassuring, and she is not overly dehydrated.  She required 2 doses of morphine here to get her pain under control now is tolerating orals.  We will plan for discharge home with very clear return ED precautions and primary care follow-up.  She will be given Norco and Zofran.  No evidence of biliary cause, LFTs are reassuring, however my bedside ultrasound does show what  appears to be a possible gallstone in the neck of the gallbladder.  I do not see any pericholecystic fluid or gallbladder wall thickening, but I do think that she merits a formal ultrasound to be thorough.  We will plan for sending out to my colleague pending the ultrasound results.  I have printed out prescriptions and spoken with her about alcohol cessation, if the ultrasound is negative that is all that will be required.  However if she does have a positive ultrasound, she would likely need to be admitted and have GI consulted.            Diagnosis:    ICD-10-CM    1. Alcohol-induced acute pancreatitis, unspecified complication status  K85.20            Discharge Medications:  New Prescriptions    No medications on file          Scribe Disclosure:  I, Sundeep Howe, am serving as a scribe at 9:09 PM on 2/16/2023 to document services personally performed by Franc Myers MD based on my observations and the provider's statements to me.   2/16/2023   Franc Myers MD Fitzgerald, Michael Maxwell Kreofsky, MD  02/16/23 1074       Franc Myers MD  02/16/23 [id number]

## 2023-02-17 NOTE — ED PROVIDER NOTES
ED course:  Patient received as a handoff from my partner Dr. Myers.  On face-to-face evaluation patient reports no additional concerns.  Right upper quadrant ultrasound without a significant findings.  Please see Dr. Myers's notes for additional details.    Abdomen US, limited (RUQ only)   Final Result   IMPRESSION:    1. Unremarkable appearance of the gallbladder and liver.   2. No biliary dilatation.            CT Chest (PE) Abdomen Pelvis w Contrast   Final Result   IMPRESSION:   1.  No acute abnormality in the chest. No evidence for pulmonary embolism.   2.  Ectasia of the ascending thoracic aorta measures 3.8 cm. No evidence for aortic dissection.            Impression:    ICD-10-CM    1. Alcohol-induced acute pancreatitis, unspecified complication status  K85.20           Disposition:  Discharge         Mac Evangelista DO  02/16/23 6112

## 2023-06-02 ENCOUNTER — HEALTH MAINTENANCE LETTER (OUTPATIENT)
Age: 48
End: 2023-06-02

## 2023-09-16 ENCOUNTER — HEALTH MAINTENANCE LETTER (OUTPATIENT)
Age: 48
End: 2023-09-16

## 2023-12-14 ENCOUNTER — HOSPITAL ENCOUNTER (EMERGENCY)
Facility: CLINIC | Age: 48
Discharge: HOME | End: 2023-12-14
Attending: EMERGENCY MEDICINE | Admitting: EMERGENCY MEDICINE
Payer: COMMERCIAL

## 2023-12-14 ENCOUNTER — APPOINTMENT (OUTPATIENT)
Dept: ULTRASOUND IMAGING | Facility: CLINIC | Age: 48
End: 2023-12-14
Attending: EMERGENCY MEDICINE
Payer: COMMERCIAL

## 2023-12-14 ENCOUNTER — APPOINTMENT (OUTPATIENT)
Dept: GENERAL RADIOLOGY | Facility: CLINIC | Age: 48
End: 2023-12-14
Attending: EMERGENCY MEDICINE
Payer: COMMERCIAL

## 2023-12-14 VITALS
RESPIRATION RATE: 18 BRPM | DIASTOLIC BLOOD PRESSURE: 89 MMHG | OXYGEN SATURATION: 99 % | HEART RATE: 73 BPM | SYSTOLIC BLOOD PRESSURE: 127 MMHG | TEMPERATURE: 97.8 F

## 2023-12-14 DIAGNOSIS — R06.02 SHORTNESS OF BREATH: ICD-10-CM

## 2023-12-14 DIAGNOSIS — R07.9 CHEST PAIN, UNSPECIFIED TYPE: ICD-10-CM

## 2023-12-14 LAB
ALBUMIN SERPL BCG-MCNC: 4.7 G/DL (ref 3.5–5.2)
ALBUMIN UR-MCNC: NEGATIVE MG/DL
ALP SERPL-CCNC: 82 U/L (ref 40–150)
ALT SERPL W P-5'-P-CCNC: 16 U/L (ref 0–50)
ANION GAP SERPL CALCULATED.3IONS-SCNC: 11 MMOL/L (ref 7–15)
APPEARANCE UR: CLEAR
AST SERPL W P-5'-P-CCNC: 20 U/L (ref 0–45)
BASOPHILS # BLD AUTO: 0 10E3/UL (ref 0–0.2)
BASOPHILS NFR BLD AUTO: 1 %
BILIRUB SERPL-MCNC: 0.4 MG/DL
BILIRUB UR QL STRIP: NEGATIVE
BUN SERPL-MCNC: 8.7 MG/DL (ref 6–20)
CALCIUM SERPL-MCNC: 9 MG/DL (ref 8.6–10)
CHLORIDE SERPL-SCNC: 98 MMOL/L (ref 98–107)
COLOR UR AUTO: ABNORMAL
CREAT SERPL-MCNC: 0.71 MG/DL (ref 0.51–0.95)
DEPRECATED HCO3 PLAS-SCNC: 26 MMOL/L (ref 22–29)
EGFRCR SERPLBLD CKD-EPI 2021: >90 ML/MIN/1.73M2
EOSINOPHIL # BLD AUTO: 0.2 10E3/UL (ref 0–0.7)
EOSINOPHIL NFR BLD AUTO: 2 %
ERYTHROCYTE [DISTWIDTH] IN BLOOD BY AUTOMATED COUNT: 11.9 % (ref 10–15)
GLUCOSE SERPL-MCNC: 89 MG/DL (ref 70–99)
GLUCOSE UR STRIP-MCNC: NEGATIVE MG/DL
HCT VFR BLD AUTO: 45.3 % (ref 35–47)
HGB BLD-MCNC: 15.8 G/DL (ref 11.7–15.7)
HGB UR QL STRIP: NEGATIVE
HOLD SPECIMEN: NORMAL
HOLD SPECIMEN: NORMAL
IMM GRANULOCYTES # BLD: 0 10E3/UL
IMM GRANULOCYTES NFR BLD: 0 %
KETONES UR STRIP-MCNC: NEGATIVE MG/DL
LEUKOCYTE ESTERASE UR QL STRIP: NEGATIVE
LIPASE SERPL-CCNC: 37 U/L (ref 13–60)
LYMPHOCYTES # BLD AUTO: 2.4 10E3/UL (ref 0.8–5.3)
LYMPHOCYTES NFR BLD AUTO: 31 %
MCH RBC QN AUTO: 31.7 PG (ref 26.5–33)
MCHC RBC AUTO-ENTMCNC: 34.9 G/DL (ref 31.5–36.5)
MCV RBC AUTO: 91 FL (ref 78–100)
MONOCYTES # BLD AUTO: 0.4 10E3/UL (ref 0–1.3)
MONOCYTES NFR BLD AUTO: 6 %
MUCOUS THREADS #/AREA URNS LPF: PRESENT /LPF
NEUTROPHILS # BLD AUTO: 4.5 10E3/UL (ref 1.6–8.3)
NEUTROPHILS NFR BLD AUTO: 60 %
NITRATE UR QL: NEGATIVE
NRBC # BLD AUTO: 0 10E3/UL
NRBC BLD AUTO-RTO: 0 /100
PH UR STRIP: 6.5 [PH] (ref 5–7)
PLATELET # BLD AUTO: 306 10E3/UL (ref 150–450)
POTASSIUM SERPL-SCNC: 3.4 MMOL/L (ref 3.4–5.3)
PROT SERPL-MCNC: 7.1 G/DL (ref 6.4–8.3)
RBC # BLD AUTO: 4.99 10E6/UL (ref 3.8–5.2)
RBC URINE: 1 /HPF
SODIUM SERPL-SCNC: 135 MMOL/L (ref 135–145)
SP GR UR STRIP: 1.01 (ref 1–1.03)
SQUAMOUS EPITHELIAL: <1 /HPF
TROPONIN T SERPL HS-MCNC: <6 NG/L
UROBILINOGEN UR STRIP-MCNC: NORMAL MG/DL
WBC # BLD AUTO: 7.5 10E3/UL (ref 4–11)
WBC URINE: 1 /HPF

## 2023-12-14 PROCEDURE — 81003 URINALYSIS AUTO W/O SCOPE: CPT | Performed by: EMERGENCY MEDICINE

## 2023-12-14 PROCEDURE — 93005 ELECTROCARDIOGRAM TRACING: CPT

## 2023-12-14 PROCEDURE — 85025 COMPLETE CBC W/AUTO DIFF WBC: CPT | Performed by: EMERGENCY MEDICINE

## 2023-12-14 PROCEDURE — 71046 X-RAY EXAM CHEST 2 VIEWS: CPT

## 2023-12-14 PROCEDURE — 80053 COMPREHEN METABOLIC PANEL: CPT | Performed by: EMERGENCY MEDICINE

## 2023-12-14 PROCEDURE — 96374 THER/PROPH/DIAG INJ IV PUSH: CPT

## 2023-12-14 PROCEDURE — 99285 EMERGENCY DEPT VISIT HI MDM: CPT | Mod: 25

## 2023-12-14 PROCEDURE — 250N000011 HC RX IP 250 OP 636: Performed by: EMERGENCY MEDICINE

## 2023-12-14 PROCEDURE — 76705 ECHO EXAM OF ABDOMEN: CPT

## 2023-12-14 PROCEDURE — 36415 COLL VENOUS BLD VENIPUNCTURE: CPT | Performed by: EMERGENCY MEDICINE

## 2023-12-14 PROCEDURE — 83690 ASSAY OF LIPASE: CPT | Performed by: EMERGENCY MEDICINE

## 2023-12-14 PROCEDURE — 84484 ASSAY OF TROPONIN QUANT: CPT | Performed by: EMERGENCY MEDICINE

## 2023-12-14 RX ORDER — ONDANSETRON 2 MG/ML
4 INJECTION INTRAMUSCULAR; INTRAVENOUS ONCE
Status: COMPLETED | OUTPATIENT
Start: 2023-12-14 | End: 2023-12-14

## 2023-12-14 RX ADMIN — ONDANSETRON 4 MG: 2 INJECTION INTRAMUSCULAR; INTRAVENOUS at 15:37

## 2023-12-14 ASSESSMENT — ACTIVITIES OF DAILY LIVING (ADL)
ADLS_ACUITY_SCORE: 35
ADLS_ACUITY_SCORE: 35

## 2023-12-14 NOTE — DISCHARGE INSTRUCTIONS
Please follow up with your GI doctor.    Discharge Instructions  Chest Pain    You have been seen today for chest pain or discomfort.  At this time, your provider has found no signs that your chest pain is due to a serious or life-threatening condition, (or you have declined more testing and/or admission to the hospital). However, sometimes there is a serious problem that does not show up right away. Your evaluation today may not be complete and you may need further testing and evaluation.     Generally, every Emergency Department visit should have a follow-up clinic visit with either a primary or a specialty clinic/provider. Please follow-up as instructed by your emergency provider today.  Return to the Emergency Department if:  Your chest pain changes, gets worse, starts to happen more often, or comes with less activity.  You are newly short of breath.  You get very weak or tired.  You pass out or faint.  You have any new symptoms, like fever, cough, numb legs, or you cough up blood.  You have anything else that worries you.    Until you follow-up with your regular provider, please do the following:  Take one aspirin daily unless you have an allergy or are told not to by your provider.  If a stress test appointment has been made, go to the appointment.  If you have questions, contact your regular provider.  Follow-up with your regular provider/clinic as directed; this is very important.    If you were given a prescription for medicine here today, be sure to read all of the information (including the package insert) that comes with your prescription.  This will include important information about the medicine, its side effects, and any warnings that you need to know about.  The pharmacist who fills the prescription can provide more information and answer questions you may have about the medicine.  If you have questions or concerns that the pharmacist cannot address, please call or return to the Emergency  Department.       Remember that you can always come back to the Emergency Department if you are not able to see your regular provider in the amount of time listed above, if you get any new symptoms, or if there is anything that worries you.

## 2023-12-14 NOTE — ED TRIAGE NOTES
Pt ambulatory into triage for chest pain/pressure and SOB that's been ongoing x 2 weeks. Has worsened in the last 2 days. Tried to eat rice today and was unable to d/t the pain. Of note, pt began hormone replacement therapy 2 months ago.

## 2023-12-14 NOTE — ED PROVIDER NOTES
History     Chief Complaint:  Shortness of Breath       HPI   Toshia Hebert is a 48 year old female who presents with shortness of breath. The patient has had two weeks of chest pressure and shortness of breath. She also has some epigastric abdominal pain as well, worse when bending down. She was unable to have rice due to the pain. She had one vomiting episode two weeks ago when this started. She had a similar episode in the spring, and presented to the ED when she was diagnosed with alcohol-induced pancreatitis.     Independent Historian:    None    Review of External Notes:  Reviewed patient's ED visit from 2/16/2023, patient was diagnosed with alcoholic pancreatitis.  At that time the patient was complaining of right-sided flank pain.    Medications:    albuterol   butalbital-acetaminophen-caffeine   hydrochlorothiazide  lisinopril     Past Medical History:    Hypertension  Temporomandibular joint disorder     Past Surgical History:    Hysterectomy  Leep   Sling transpubo with anterior colporrhaphy  TMJ surgery  Uterine ablation     Physical Exam   Patient Vitals for the past 24 hrs:   BP Temp Temp src Pulse Resp SpO2   12/14/23 1346 127/89 97.8  F (36.6  C) Oral 73 18 99 %        Physical Exam  General: Well-nourished, resting comfortably when I enter the room  Eyes: Pupils equal, conjunctivae pink no scleral icterus or conjunctival injection  ENT:  Moist mucus membranes  Respiratory:  Lungs clear to auscultation bilaterally, no crackles/rubs/wheezes.  Good air movement  CV: Normal rate and rhythm, no murmurs  GI:  Abdomen soft and non-distended.  No tenderness, guarding or rebound  Skin: Warm, dry.  No rashes or petechiae  Musculoskeletal: No peripheral edema or calf tenderness  Neuro: Alert and oriented to person/place/time  Psychiatric: Normal affect    Emergency Department Course   ECG  ECG results from 12/14/23   EKG 12-lead, tracing only     Value    Systolic Blood Pressure     Diastolic Blood  Pressure     Ventricular Rate 65    Atrial Rate 65    HI Interval 178    QRS Duration 94        QTc 443    P Axis 60    R AXIS 49    T Axis 48    Interpretation ECG      Sinus rhythm  Normal ECG  When compared with ECG of 30-JUL-2014 10:55,  No significant change was found       Imaging:  Chest XR,  PA & LAT   Final Result   IMPRESSION: No acute cardiopulmonary disease.      ADONAY CASTRO MD            SYSTEM ID:  [id number]      US Abdomen Limited (RUQ)   Final Result   IMPRESSION:   1.  Normal limited abdominal ultrasound.      VIKI MAS MD            SYSTEM ID:  [id number]        Report per radiology    Laboratory:  Labs Ordered and Resulted from Time of ED Arrival to Time of ED Departure   ROUTINE UA WITH MICROSCOPIC REFLEX TO CULTURE - Abnormal       Result Value    Color Urine Orange (*)     Appearance Urine Clear      Glucose Urine Negative      Bilirubin Urine Negative      Ketones Urine Negative      Specific Gravity Urine 1.015      Blood Urine Negative      pH Urine 6.5      Protein Albumin Urine Negative      Urobilinogen Urine Normal      Nitrite Urine Negative      Leukocyte Esterase Urine Negative      Mucus Urine Present (*)     RBC Urine 1      WBC Urine 1      Squamous Epithelials Urine <1     CBC WITH PLATELETS AND DIFFERENTIAL - Abnormal    WBC Count 7.5      RBC Count 4.99      Hemoglobin 15.8 (*)     Hematocrit 45.3      MCV 91      MCH 31.7      MCHC 34.9      RDW 11.9      Platelet Count 306      % Neutrophils 60      % Lymphocytes 31      % Monocytes 6      % Eosinophils 2      % Basophils 1      % Immature Granulocytes 0      NRBCs per 100 WBC 0      Absolute Neutrophils 4.5      Absolute Lymphocytes 2.4      Absolute Monocytes 0.4      Absolute Eosinophils 0.2      Absolute Basophils 0.0      Absolute Immature Granulocytes 0.0      Absolute NRBCs 0.0     COMPREHENSIVE METABOLIC PANEL - Normal    Sodium 135      Potassium 3.4      Carbon Dioxide (CO2) 26      Anion Gap 11      Urea  Nitrogen 8.7      Creatinine 0.71      GFR Estimate >90      Calcium 9.0      Chloride 98      Glucose 89      Alkaline Phosphatase 82      AST 20      ALT 16      Protein Total 7.1      Albumin 4.7      Bilirubin Total 0.4     LIPASE - Normal    Lipase 37     TROPONIN T, HIGH SENSITIVITY - Normal    Troponin T, High Sensitivity <6        Emergency Department Course & Assessments:    Interventions:  Medications   ondansetron (ZOFRAN) injection 4 mg (4 mg Intravenous $Given 23 1530)     Assessments:  1526 I obtained history and examined patient.   1630 I rechecked the patient and explained findings.    Independent Interpretation (X-rays, CTs, rhythm strip):  None    Consultations/Discussion of Management or Tests:  None    Social Determinants of Health affecting care:  None     Disposition:  The patient was discharged to home.   Impression & Plan    Medical Decision Makin-year-old female presents emergency department with a complaint of 2 weeks of chest pressure and shortness of breath.  She states that she feels a fullness in her right upper quadrant.  She had 1 episode of vomiting 2 weeks ago, and has not had any vomiting since.  She has seen a GI doctor for similar symptoms.  She states that all of her workup has been normal.  On exam patient is not in any acute distress.  She has mild tenderness to palpation in the right upper quadrant, but describes it more as a fullness.  She currently does not have any chest pain.  Ultrasound is reassuring, no signs of cholecystitis or cholelithiasis or fatty liver.  Lab work is also reassuring, no signs of pancreatitis, no elevated liver enzymes.  EKG and troponin are also reassuring.  Patient is not tachycardic, hypoxic, or tachypneic, I have low suspicion for PE.  Patient reports that she feels like there is something enlarged on the right side of her abdomen.  I did offer her a CT scan, she refuses.  I also recommended starting a PPI and Carafate.  Patient also  refuses this.  She states that she will call GI and schedule an appointment as well as with her primary care physician.    Diagnosis:    ICD-10-CM    1. Chest pain, unspecified type  R07.9       2. Shortness of breath  R06.02            Discharge Medications:  New Prescriptions    No medications on file      Scribe Disclosure:  Isa NORWOODed, am serving as a scribe at 3:29 PM on 12/14/2023 to document services personally performed by Francisca Pérez MD based on my observations and the provider's statements to me.  12/14/2023   Francisca Pérez MD Richardson, Elizabeth, MD  12/14/23 1639

## 2023-12-15 LAB
ATRIAL RATE - MUSE: 65 BPM
DIASTOLIC BLOOD PRESSURE - MUSE: NORMAL MMHG
INTERPRETATION ECG - MUSE: NORMAL
P AXIS - MUSE: 60 DEGREES
PR INTERVAL - MUSE: 178 MS
QRS DURATION - MUSE: 94 MS
QT - MUSE: 426 MS
QTC - MUSE: 443 MS
R AXIS - MUSE: 49 DEGREES
SYSTOLIC BLOOD PRESSURE - MUSE: NORMAL MMHG
T AXIS - MUSE: 48 DEGREES
VENTRICULAR RATE- MUSE: 65 BPM

## 2024-08-31 ENCOUNTER — HEALTH MAINTENANCE LETTER (OUTPATIENT)
Age: 49
End: 2024-08-31